# Patient Record
Sex: MALE | Race: WHITE | NOT HISPANIC OR LATINO | Employment: FULL TIME | URBAN - METROPOLITAN AREA
[De-identification: names, ages, dates, MRNs, and addresses within clinical notes are randomized per-mention and may not be internally consistent; named-entity substitution may affect disease eponyms.]

---
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
## 2021-01-03 ENCOUNTER — CLINICAL SUPPORT (OUTPATIENT)
Dept: URGENT CARE | Facility: CLINIC | Age: 21
End: 2021-01-03
Payer: COMMERCIAL

## 2021-01-03 VITALS — TEMPERATURE: 99 F | HEART RATE: 86 BPM | OXYGEN SATURATION: 96 %

## 2021-01-03 DIAGNOSIS — U07.1 COVID-19 VIRUS DETECTED: ICD-10-CM

## 2021-01-03 DIAGNOSIS — J02.9 SORE THROAT: Primary | ICD-10-CM

## 2021-01-03 LAB
CTP QC/QA: YES
SARS-COV-2 RDRP RESP QL NAA+PROBE: POSITIVE

## 2021-01-03 PROCEDURE — U0002: ICD-10-PCS | Mod: QW,S$GLB,, | Performed by: FAMILY MEDICINE

## 2021-01-03 PROCEDURE — U0002 COVID-19 LAB TEST NON-CDC: HCPCS | Mod: QW,S$GLB,, | Performed by: FAMILY MEDICINE

## 2021-01-25 ENCOUNTER — CLINICAL SUPPORT (OUTPATIENT)
Dept: URGENT CARE | Facility: CLINIC | Age: 21
End: 2021-01-25
Payer: COMMERCIAL

## 2021-01-25 DIAGNOSIS — Z11.52 ENCOUNTER FOR SCREENING LABORATORY TESTING FOR COVID-19 VIRUS: Primary | ICD-10-CM

## 2021-01-25 LAB
CTP QC/QA: YES
SARS-COV-2 RDRP RESP QL NAA+PROBE: POSITIVE

## 2021-01-25 PROCEDURE — U0002 COVID-19 LAB TEST NON-CDC: HCPCS | Mod: QW,S$GLB,, | Performed by: FAMILY MEDICINE

## 2021-01-25 PROCEDURE — 99211 OFF/OP EST MAY X REQ PHY/QHP: CPT | Mod: S$GLB,CS,, | Performed by: FAMILY MEDICINE

## 2021-01-25 PROCEDURE — 99211 PR OFFICE/OUTPT VISIT, EST, LEVL I: ICD-10-PCS | Mod: S$GLB,CS,, | Performed by: FAMILY MEDICINE

## 2021-01-25 PROCEDURE — U0002: ICD-10-PCS | Mod: QW,S$GLB,, | Performed by: FAMILY MEDICINE

## 2021-04-29 ENCOUNTER — PATIENT MESSAGE (OUTPATIENT)
Dept: RESEARCH | Facility: HOSPITAL | Age: 21
End: 2021-04-29

## 2021-07-06 ENCOUNTER — PATIENT MESSAGE (OUTPATIENT)
Dept: PEDIATRICS | Facility: CLINIC | Age: 21
End: 2021-07-06

## 2021-07-12 ENCOUNTER — PATIENT MESSAGE (OUTPATIENT)
Dept: PEDIATRICS | Facility: CLINIC | Age: 21
End: 2021-07-12

## 2021-12-21 ENCOUNTER — OFFICE VISIT (OUTPATIENT)
Dept: URGENT CARE | Facility: CLINIC | Age: 21
End: 2021-12-21
Payer: COMMERCIAL

## 2021-12-21 VITALS
HEIGHT: 73 IN | TEMPERATURE: 98 F | HEART RATE: 62 BPM | DIASTOLIC BLOOD PRESSURE: 82 MMHG | OXYGEN SATURATION: 100 % | BODY MASS INDEX: 21.2 KG/M2 | RESPIRATION RATE: 16 BRPM | WEIGHT: 160 LBS | SYSTOLIC BLOOD PRESSURE: 130 MMHG

## 2021-12-21 DIAGNOSIS — J06.9 UPPER RESPIRATORY TRACT INFECTION, UNSPECIFIED TYPE: Primary | ICD-10-CM

## 2021-12-21 DIAGNOSIS — H65.01 NON-RECURRENT ACUTE SEROUS OTITIS MEDIA OF RIGHT EAR: ICD-10-CM

## 2021-12-21 LAB
CTP QC/QA: YES
CTP QC/QA: YES
POC MOLECULAR INFLUENZA A AGN: NEGATIVE
POC MOLECULAR INFLUENZA B AGN: NEGATIVE
SARS-COV-2 RDRP RESP QL NAA+PROBE: NEGATIVE

## 2021-12-21 PROCEDURE — 99213 OFFICE O/P EST LOW 20 MIN: CPT | Mod: S$GLB,,, | Performed by: PHYSICIAN ASSISTANT

## 2021-12-21 PROCEDURE — 87502 INFLUENZA DNA AMP PROBE: CPT | Mod: QW,S$GLB,, | Performed by: PHYSICIAN ASSISTANT

## 2021-12-21 PROCEDURE — U0002: ICD-10-PCS | Mod: QW,S$GLB,, | Performed by: PHYSICIAN ASSISTANT

## 2021-12-21 PROCEDURE — 87502 POCT INFLUENZA A/B MOLECULAR: ICD-10-PCS | Mod: QW,S$GLB,, | Performed by: PHYSICIAN ASSISTANT

## 2021-12-21 PROCEDURE — 99213 PR OFFICE/OUTPT VISIT, EST, LEVL III, 20-29 MIN: ICD-10-PCS | Mod: S$GLB,,, | Performed by: PHYSICIAN ASSISTANT

## 2021-12-21 PROCEDURE — U0002 COVID-19 LAB TEST NON-CDC: HCPCS | Mod: QW,S$GLB,, | Performed by: PHYSICIAN ASSISTANT

## 2022-07-09 ENCOUNTER — OFFICE VISIT (OUTPATIENT)
Dept: URGENT CARE | Facility: CLINIC | Age: 22
End: 2022-07-09
Payer: COMMERCIAL

## 2022-07-09 VITALS
TEMPERATURE: 98 F | DIASTOLIC BLOOD PRESSURE: 79 MMHG | RESPIRATION RATE: 18 BRPM | OXYGEN SATURATION: 98 % | HEIGHT: 74 IN | BODY MASS INDEX: 23.1 KG/M2 | SYSTOLIC BLOOD PRESSURE: 127 MMHG | HEART RATE: 49 BPM | WEIGHT: 180 LBS

## 2022-07-09 DIAGNOSIS — R05.9 COUGH: ICD-10-CM

## 2022-07-09 DIAGNOSIS — J06.9 VIRAL URI WITH COUGH: Primary | ICD-10-CM

## 2022-07-09 DIAGNOSIS — F17.200 SMOKER: ICD-10-CM

## 2022-07-09 LAB
CTP QC/QA: YES
SARS-COV-2 RDRP RESP QL NAA+PROBE: NEGATIVE

## 2022-07-09 PROCEDURE — 3008F PR BODY MASS INDEX (BMI) DOCUMENTED: ICD-10-PCS | Mod: CPTII,S$GLB,, | Performed by: FAMILY MEDICINE

## 2022-07-09 PROCEDURE — 3008F BODY MASS INDEX DOCD: CPT | Mod: CPTII,S$GLB,, | Performed by: FAMILY MEDICINE

## 2022-07-09 PROCEDURE — U0002 COVID-19 LAB TEST NON-CDC: HCPCS | Mod: QW,S$GLB,, | Performed by: FAMILY MEDICINE

## 2022-07-09 PROCEDURE — 3078F PR MOST RECENT DIASTOLIC BLOOD PRESSURE < 80 MM HG: ICD-10-PCS | Mod: CPTII,S$GLB,, | Performed by: FAMILY MEDICINE

## 2022-07-09 PROCEDURE — 3074F SYST BP LT 130 MM HG: CPT | Mod: CPTII,S$GLB,, | Performed by: FAMILY MEDICINE

## 2022-07-09 PROCEDURE — 1159F PR MEDICATION LIST DOCUMENTED IN MEDICAL RECORD: ICD-10-PCS | Mod: CPTII,S$GLB,, | Performed by: FAMILY MEDICINE

## 2022-07-09 PROCEDURE — 1159F MED LIST DOCD IN RCRD: CPT | Mod: CPTII,S$GLB,, | Performed by: FAMILY MEDICINE

## 2022-07-09 PROCEDURE — 1160F RVW MEDS BY RX/DR IN RCRD: CPT | Mod: CPTII,S$GLB,, | Performed by: FAMILY MEDICINE

## 2022-07-09 PROCEDURE — 99213 PR OFFICE/OUTPT VISIT, EST, LEVL III, 20-29 MIN: ICD-10-PCS | Mod: S$GLB,,, | Performed by: FAMILY MEDICINE

## 2022-07-09 PROCEDURE — 1160F PR REVIEW ALL MEDS BY PRESCRIBER/CLIN PHARMACIST DOCUMENTED: ICD-10-PCS | Mod: CPTII,S$GLB,, | Performed by: FAMILY MEDICINE

## 2022-07-09 PROCEDURE — U0002: ICD-10-PCS | Mod: QW,S$GLB,, | Performed by: FAMILY MEDICINE

## 2022-07-09 PROCEDURE — 99213 OFFICE O/P EST LOW 20 MIN: CPT | Mod: S$GLB,,, | Performed by: FAMILY MEDICINE

## 2022-07-09 PROCEDURE — 3074F PR MOST RECENT SYSTOLIC BLOOD PRESSURE < 130 MM HG: ICD-10-PCS | Mod: CPTII,S$GLB,, | Performed by: FAMILY MEDICINE

## 2022-07-09 PROCEDURE — 3078F DIAST BP <80 MM HG: CPT | Mod: CPTII,S$GLB,, | Performed by: FAMILY MEDICINE

## 2022-07-09 RX ORDER — FLUOXETINE HYDROCHLORIDE 20 MG/1
20 CAPSULE ORAL DAILY
COMMUNITY
Start: 2022-07-05 | End: 2022-12-08

## 2022-07-09 RX ORDER — BENZONATATE 100 MG/1
100 CAPSULE ORAL 3 TIMES DAILY PRN
Qty: 30 CAPSULE | Refills: 0 | Status: SHIPPED | OUTPATIENT
Start: 2022-07-09 | End: 2022-07-19

## 2022-07-09 RX ORDER — PROMETHAZINE HYDROCHLORIDE AND DEXTROMETHORPHAN HYDROBROMIDE 6.25; 15 MG/5ML; MG/5ML
5 SYRUP ORAL EVERY 6 HOURS PRN
Qty: 118 ML | Refills: 0 | Status: SHIPPED | OUTPATIENT
Start: 2022-07-09 | End: 2023-09-26

## 2022-07-09 NOTE — PATIENT INSTRUCTIONS
General Discharge Instructions   PLEASE READ YOUR DISCHARGE INSTRUCTIONS ENTIRELY AS IT CONTAINS IMPORTANT INFORMATION.  If you were prescribed a narcotic or controlled medication, do not drive or operate heavy equipment or machinery while taking these medications.  If you were prescribed antibiotics, please take them to completion.  You must understand that you've received an Urgent Care treatment only and that you may be released before all your medical problems are known or treated. You, the patient, will arrange for follow up care as instructed.    OVER THE COUNTER RECOMMENDATIONS/SUGGESTIONS.    Make sure to stay well hydrated.    Use Nasal Saline to mechanically move any post nasal drip from your eustachian tube or from the back of your throat.    Use warm salt water gargles to ease your throat pain. Warm salt water gargles as needed for sore throat- 1/2 tsp salt to 1 cup warm water, gargle as desired.    Use an antihistamine such as Claritin, Zyrtec or Allegra to dry you out.    Use pseudoephedrine (behind the counter) to decongest. Pseudoephedrine 30 mg up to 240 mg /day. It can raise your blood pressure and give you palpitations.    Use mucinex (guaifenesin) to break up mucous up to 2400mg/day to loosen any mucous.    The mucinex DM pill has a cough suppressant that can be sedating. It can be used at night to stop the tickle at the back of your throat.    You can use Mucinex D (it has guaifenesin and a high dose of pseudoephedrine) in the mornings to help decongest.    Use Afrin in each nare for no longer than 3 days, as it is addictive. It can also dry out your mucous membranes and cause elevated blood pressure. This is especially useful if you are flying.    Use Flonase 1-2 sprays/nostril per day. It is a local acting steroid nasal spray, if you develop a bloody nose, stop using the medication immediately.    Sometimes Nyquil at night is beneficial to help you get some rest, however it is sedating and it  does have an antihistamine, and tylenol.    Honey is a natural cough suppressant that can be used.    Tylenol up to 4,000 mg a day is safe for short periods and can be used for body aches, pain, and fever. However in high doses and prolonged use it can cause liver irritation.    Ibuprofen is a non-steroidal anti-inflammatory that can be used for body aches, pain, and fever.However it can also cause stomach irritation if over used.     Follow up with your PCP or specialty clinic as instructed in the next 2-3 days if not improved or as needed. You can call [phone] to schedule an appointment with appropriate provider.      If you condition worsens, we recommend that you receive another evaluation at the emergency room immediately or contact your primary medical clinic's after hours call service to discuss your concerns.      Please return here or go to the Emergency Department for any concerns or worsening condition.   You can also call [phone] to schedule an appointment with the appropriate provider.    Please return here or go to the Emergency Department for any concerns or worsening of condition.    Thank you for choosing [facility]!    Our goal in the Urgent Care is to always provide outstanding medical care. You may receive a survey by mail or e-mail in the next week regarding your experience today. We would greatly appreciate you completing and returning the survey. Your feedback provides us with a way to recognize our staff who provide very good care, and it helps us learn how to improve when your experience was below our aspiration of excellence.      We appreciate you trusting us with your medical care. We hope you feel better soon. We will be happy to take care of you for all of your future medical needs.    Sincerely,    JOES Rosen  NEGATIVE COVID TEST  -You have tested negative for COVID-19 today.  If you did not have a close exposure (as defined below) you can return to  "your normal daily activities to include social distancing, wearing a mask and frequent handwashing.  -A "close exposure" is defined as anyone who has had an exposure (masked or unmasked) to a known COVID -19 positive person within 6 ft for longer than 15 minutes. If your exposure meets this definition, you are required by CDC guidelines to quarantine for at least 7-10 days from time of exposure.  -The CDC states that a test can be performed for an asymptomatic patient (someone who does not have any symptoms) after a close exposure, and that a test should be done if you develop symptoms after a close exposure as described above.  -Specifically, you can test at day 5 or later if asymptomatic in order to get released from quarantine on day 7 or later.  If you develop symptoms sooner, you should test when your symptoms start.  -If you developed symptoms since the exposure, and your test was negative today and less than 5 days from your exposure, you still have to quarantine for 7-10 days from the date of the exposure.  -The 7-10 day quarantine begins from the day you were exposed, not the day of your test.  For example, if your exposure was on a Monday, and you waited until Friday of the same week to get tested and it was negative, your 7-10 day quarantine begins from that Monday, not the Friday you tested negative.  -Please note, if you decide to test as an asymptomatic during your quarantine and you are positive, you will be restarting your quarantine and moving from a possible 10 day quarantine (if you do not test), to a 11 day or greater quarantine.  When to Seek Emergency Medical Attention  Look for emergency warning signs* for COVID-19. If someone is showing any of these signs, seek emergency medical care immediately:    Trouble breathing  Persistent pain or pressure in the chest  New confusion  Inability to wake or stay awake  Pale, gray, or blue-colored skin, lips, or nail beds, depending on skin tone  *This " "list is not all possible symptoms. Please call your medical provider for any other symptoms that are severe or concerning to you.    Call 911 or call ahead to your local emergency facility: Notify the  that you are seeking care for someone who has or may have COVID-19.  Patient Instructions   PLEASE READ YOUR DISCHARGE INSTRUCTIONS ENTIRELY AS IT CONTAINS IMPORTANT INFORMATION.        Please drink plenty of fluids. You body needs increased water but other beverages may aid in comfort.  You will know that you have had enough water to be hydrated when your urine is clear or at least a very pale yellow. Nasal saline may help with removal of mucus as well.  Ibuprofen is preferred for aches and pains as well as fever reduction. If you do not have high blood pressure, then you may use a decongestant such as pseudoephedrine or one of the above medications that have the letter, "-D" following it.  Hot tea with honey can help with sore throats as the heat with reduce the inflammation and the honey will coat your throat to help it feel better. Prescription pain medicine should be used for the significant throat sxs you are experiencing. Do not drive after taking this medication.     Lastly, good hand washing and cough hygiene (cough into your elbow) will help prevent the spread of the illness.  A general rule is that you are no longer contagious once you have been without a fever for over 24 hours without requiring fever reducing medications.   Please get plenty of rest.     Please return here or go to the Emergency Department for any concerns or worsening of condition.     Please take an over the counter antihistamine medication (allegra/Claritin/Zyrtec) of your choice as directed, they may help with some of the runny nose symptoms if you are having them.       Can continue mucinex. Use mucinex (guaifenisin) to break up mucous up to 2400mg/day to loosen any mucous. The mucinex DM pill has a cough suppressant that can " be used at night to stop the tickle at the back of your throat. You may use Mucinex to help thin thick secretions to allow you to expel them but it only works if you drink more water.     If not allergic, please take over the counter Tylenol (Acetaminophen) and/or Motrin (Ibuprofen) as directed for control of pain and/or fever.  Please follow up with your primary care doctor or specialist as needed.     Sore throat recommendations: Warm fluids, warm salt water gargles, throat lozenges, tea, honey, soup, rest, hydration.     Use over the counter flonase: one spray each nostril twice daily OR two sprays each nostril once daily.      Sinus rinses DO NOT USE TAP WATER, if you must, water must be a rolling boil for 1 minute, let it cool, then use.  May use distilled water, or over the counter nasal saline rinses.  Vics vapor rub in shower to help open nasal passages.  May use nasal gel to keep passages moisturized.  May use Nasal saline sprays during the day for added relief of congestion.   For those who go to the gym, please do not use the sauna or steam room now to clear sinuses.     If you  smoke, please stop smoking.        Please return or see your primary care doctor if you develop new or worsening symptoms.      Please arrange follow up with your primary medical clinic as soon as possible. You must understand that you've received an Urgent Care treatment only and that you may be released before all of your medical problems are known or treated. You, the patient, will arrange for follow up as instructed. If your symptoms worsen or fail to improve you should go to the Emergency Room.

## 2022-07-09 NOTE — PROGRESS NOTES
"Subjective:       Patient ID: Zaki Lopez is a 22 y.o. male.    Vitals:  height is 6' 2" (1.88 m) and weight is 81.6 kg (180 lb). His temperature is 97.8 °F (36.6 °C). His blood pressure is 127/79 and his pulse is 49 (abnormal). His respiration is 18 and oxygen saturation is 98%.     Chief Complaint: No chief complaint on file.    Pt c/o sore throat, coughing x1 day.  Provider note begins below:  Pt c/o sore throat and cough x 1 day, he denies any known sick contacts. He tried ibu with relief. He has been able to eat and drink without problem. No fever or chills. No cp or SOB. No GI related symptoms, including, N/v/D or constipation. No anosmia or ageusia.      Cough  This is a new problem. The current episode started yesterday. The problem has been unchanged. The problem occurs constantly. The cough is non-productive. Associated symptoms include headaches, nasal congestion and a sore throat. Pertinent negatives include no chest pain, chills, ear congestion, ear pain, fever, heartburn, hemoptysis, myalgias, postnasal drip, rash, rhinorrhea, shortness of breath, sweats, weight loss or wheezing. Nothing aggravates the symptoms. He has tried nothing for the symptoms. The treatment provided no relief. There is no history of asthma, bronchiectasis, bronchitis, COPD, emphysema, environmental allergies or pneumonia.       Constitution: Negative for activity change, appetite change, chills, sweating, fatigue, fever, unexpected weight change and generalized weakness.   HENT: Positive for sore throat. Negative for ear pain, facial trauma, congestion, nosebleeds, postnasal drip, sinus pain, sinus pressure, trouble swallowing and voice change.    Neck: Negative for neck pain and neck stiffness.   Cardiovascular: Negative for chest pain.   Respiratory: Positive for cough. Negative for bloody sputum, shortness of breath, wheezing and asthma.    Gastrointestinal: Negative for heartburn.   Musculoskeletal: Negative for " "muscle ache.   Skin: Negative for rash.   Allergic/Immunologic: Negative for environmental allergies and asthma.   Neurological: Positive for headaches.       Objective:      Physical Exam   Constitutional: He is oriented to person, place, and time. He appears well-developed.  Non-toxic appearance. He does not appear ill. No distress.   HENT:   Head: Normocephalic and atraumatic.   Ears:   Right Ear: External ear normal.   Left Ear: External ear normal.   Nose: Nose normal.   Mouth/Throat: Uvula is midline, oropharynx is clear and moist and mucous membranes are normal. No trismus in the jaw. No uvula swelling. No oropharyngeal exudate, posterior oropharyngeal edema, posterior oropharyngeal erythema, tonsillar abscesses or cobblestoning. No tonsillar exudate.   Eyes: Conjunctivae, EOM and lids are normal. Pupils are equal, round, and reactive to light.   Neck: Trachea normal and phonation normal. Neck supple. No Brudzinski's sign and no Kernig's sign noted.   Cardiovascular: Bradycardia present.      Comments: Known hx of bradycardia.    Pulmonary/Chest: Effort normal and breath sounds normal.   Musculoskeletal: Normal range of motion.         General: Normal range of motion.   Lymphadenopathy:     He has no cervical adenopathy.   Neurological: He is alert and oriented to person, place, and time.   Skin: Skin is warm, dry, intact and not diaphoretic.   Psychiatric: His speech is normal and behavior is normal. Judgment and thought content normal.   Nursing note and vitals reviewed.        Assessment:       1. Viral URI with cough    2. Cough    3. Smoker        Results for orders placed or performed in visit on 07/09/22   POCT COVID-19 Rapid Screening   Result Value Ref Range    POC Rapid COVID Negative Negative     Acceptable Yes       Offered strep test, he declined, "this doesn't feel like strep."   Plan:       Advised on salt water gargles, throat lozenges, tea with honey, alternate tylenol and ibu " for ha/body aches   cv neg, rtw note provided    Discussed results/diagnosis/plan with patient in clinic. Strict precautions given to patient to monitor for worsening signs and symptoms. Advised to follow up with PCP or specialist.    Explained side effects of medications prescribed with patient and informed him/her to discontinue use if he/she has any side effects and to inform UC or PCP if this occurs. All questions answered. Strict ED verses clinic return precautions stressed and given in depth. Advised if symptoms worsens of fail to improve he/she should go to the Emergency Room. Discharge and follow-up instructions given verbally/printed with the patient who expressed understanding and willingness to comply with my recommendations. Patient voiced understanding and in agreement with current treatment plan. Patient exits the exam room in no acute distress. Conversant and engaged during discharge discussion, verbalized understanding.      Viral URI with cough  -     promethazine-dextromethorphan (PROMETHAZINE-DM) 6.25-15 mg/5 mL Syrp; Take 5 mLs by mouth every 6 (six) hours as needed (cough).  Dispense: 118 mL; Refill: 0  -     benzonatate (TESSALON) 100 MG capsule; Take 1 capsule (100 mg total) by mouth 3 (three) times daily as needed for Cough.  Dispense: 30 capsule; Refill: 0    Cough  -     POCT COVID-19 Rapid Screening    Smoker  -     Ambulatory referral/consult to Smoking Cessation Program           Medical Decision Making:   Clinical Tests:   Lab Tests: Ordered and Reviewed       Patient Instructions     General Discharge Instructions   PLEASE READ YOUR DISCHARGE INSTRUCTIONS ENTIRELY AS IT CONTAINS IMPORTANT INFORMATION.  If you were prescribed a narcotic or controlled medication, do not drive or operate heavy equipment or machinery while taking these medications.  If you were prescribed antibiotics, please take them to completion.  You must understand that you've received an Urgent Care treatment only  and that you may be released before all your medical problems are known or treated. You, the patient, will arrange for follow up care as instructed.    OVER THE COUNTER RECOMMENDATIONS/SUGGESTIONS.    Make sure to stay well hydrated.    Use Nasal Saline to mechanically move any post nasal drip from your eustachian tube or from the back of your throat.    Use warm salt water gargles to ease your throat pain. Warm salt water gargles as needed for sore throat- 1/2 tsp salt to 1 cup warm water, gargle as desired.    Use an antihistamine such as Claritin, Zyrtec or Allegra to dry you out.    Use pseudoephedrine (behind the counter) to decongest. Pseudoephedrine 30 mg up to 240 mg /day. It can raise your blood pressure and give you palpitations.    Use mucinex (guaifenesin) to break up mucous up to 2400mg/day to loosen any mucous.    The mucinex DM pill has a cough suppressant that can be sedating. It can be used at night to stop the tickle at the back of your throat.    You can use Mucinex D (it has guaifenesin and a high dose of pseudoephedrine) in the mornings to help decongest.    Use Afrin in each nare for no longer than 3 days, as it is addictive. It can also dry out your mucous membranes and cause elevated blood pressure. This is especially useful if you are flying.    Use Flonase 1-2 sprays/nostril per day. It is a local acting steroid nasal spray, if you develop a bloody nose, stop using the medication immediately.    Sometimes Nyquil at night is beneficial to help you get some rest, however it is sedating and it does have an antihistamine, and tylenol.    Honey is a natural cough suppressant that can be used.    Tylenol up to 4,000 mg a day is safe for short periods and can be used for body aches, pain, and fever. However in high doses and prolonged use it can cause liver irritation.    Ibuprofen is a non-steroidal anti-inflammatory that can be used for body aches, pain, and fever.However it can also cause  "stomach irritation if over used.     Follow up with your PCP or specialty clinic as instructed in the next 2-3 days if not improved or as needed. You can call [phone] to schedule an appointment with appropriate provider.      If you condition worsens, we recommend that you receive another evaluation at the emergency room immediately or contact your primary medical clinic's after hours call service to discuss your concerns.      Please return here or go to the Emergency Department for any concerns or worsening condition.   You can also call [phone] to schedule an appointment with the appropriate provider.    Please return here or go to the Emergency Department for any concerns or worsening of condition.    Thank you for choosing [facility]!    Our goal in the Urgent Care is to always provide outstanding medical care. You may receive a survey by mail or e-mail in the next week regarding your experience today. We would greatly appreciate you completing and returning the survey. Your feedback provides us with a way to recognize our staff who provide very good care, and it helps us learn how to improve when your experience was below our aspiration of excellence.      We appreciate you trusting us with your medical care. We hope you feel better soon. We will be happy to take care of you for all of your future medical needs.    Sincerely,    JOSE Rosen  NEGATIVE COVID TEST  -You have tested negative for COVID-19 today.  If you did not have a close exposure (as defined below) you can return to your normal daily activities to include social distancing, wearing a mask and frequent handwashing.  -A "close exposure" is defined as anyone who has had an exposure (masked or unmasked) to a known COVID -19 positive person within 6 ft for longer than 15 minutes. If your exposure meets this definition, you are required by CDC guidelines to quarantine for at least 7-10 days from time of exposure.  -The " CDC states that a test can be performed for an asymptomatic patient (someone who does not have any symptoms) after a close exposure, and that a test should be done if you develop symptoms after a close exposure as described above.  -Specifically, you can test at day 5 or later if asymptomatic in order to get released from quarantine on day 7 or later.  If you develop symptoms sooner, you should test when your symptoms start.  -If you developed symptoms since the exposure, and your test was negative today and less than 5 days from your exposure, you still have to quarantine for 7-10 days from the date of the exposure.  -The 7-10 day quarantine begins from the day you were exposed, not the day of your test.  For example, if your exposure was on a Monday, and you waited until Friday of the same week to get tested and it was negative, your 7-10 day quarantine begins from that Monday, not the Friday you tested negative.  -Please note, if you decide to test as an asymptomatic during your quarantine and you are positive, you will be restarting your quarantine and moving from a possible 10 day quarantine (if you do not test), to a 11 day or greater quarantine.  When to Seek Emergency Medical Attention  Look for emergency warning signs* for COVID-19. If someone is showing any of these signs, seek emergency medical care immediately:    Trouble breathing  Persistent pain or pressure in the chest  New confusion  Inability to wake or stay awake  Pale, gray, or blue-colored skin, lips, or nail beds, depending on skin tone  *This list is not all possible symptoms. Please call your medical provider for any other symptoms that are severe or concerning to you.    Call 911 or call ahead to your local emergency facility: Notify the  that you are seeking care for someone who has or may have COVID-19.  Patient Instructions   PLEASE READ YOUR DISCHARGE INSTRUCTIONS ENTIRELY AS IT CONTAINS IMPORTANT INFORMATION.        Please drink  "plenty of fluids. You body needs increased water but other beverages may aid in comfort.  You will know that you have had enough water to be hydrated when your urine is clear or at least a very pale yellow. Nasal saline may help with removal of mucus as well.  Ibuprofen is preferred for aches and pains as well as fever reduction. If you do not have high blood pressure, then you may use a decongestant such as pseudoephedrine or one of the above medications that have the letter, "-D" following it.  Hot tea with honey can help with sore throats as the heat with reduce the inflammation and the honey will coat your throat to help it feel better. Prescription pain medicine should be used for the significant throat sxs you are experiencing. Do not drive after taking this medication.     Lastly, good hand washing and cough hygiene (cough into your elbow) will help prevent the spread of the illness.  A general rule is that you are no longer contagious once you have been without a fever for over 24 hours without requiring fever reducing medications.   Please get plenty of rest.     Please return here or go to the Emergency Department for any concerns or worsening of condition.     Please take an over the counter antihistamine medication (allegra/Claritin/Zyrtec) of your choice as directed, they may help with some of the runny nose symptoms if you are having them.       Can continue mucinex. Use mucinex (guaifenisin) to break up mucous up to 2400mg/day to loosen any mucous. The mucinex DM pill has a cough suppressant that can be used at night to stop the tickle at the back of your throat. You may use Mucinex to help thin thick secretions to allow you to expel them but it only works if you drink more water.     If not allergic, please take over the counter Tylenol (Acetaminophen) and/or Motrin (Ibuprofen) as directed for control of pain and/or fever.  Please follow up with your primary care doctor or specialist as needed.   "   Sore throat recommendations: Warm fluids, warm salt water gargles, throat lozenges, tea, honey, soup, rest, hydration.     Use over the counter flonase: one spray each nostril twice daily OR two sprays each nostril once daily.      Sinus rinses DO NOT USE TAP WATER, if you must, water must be a rolling boil for 1 minute, let it cool, then use.  May use distilled water, or over the counter nasal saline rinses.  Vics vapor rub in shower to help open nasal passages.  May use nasal gel to keep passages moisturized.  May use Nasal saline sprays during the day for added relief of congestion.   For those who go to the gym, please do not use the sauna or steam room now to clear sinuses.     If you  smoke, please stop smoking.        Please return or see your primary care doctor if you develop new or worsening symptoms.      Please arrange follow up with your primary medical clinic as soon as possible. You must understand that you've received an Urgent Care treatment only and that you may be released before all of your medical problems are known or treated. You, the patient, will arrange for follow up as instructed. If your symptoms worsen or fail to improve you should go to the Emergency Room.

## 2022-07-09 NOTE — LETTER
July 9, 2022      [facility]  [address]  [place] [place] [address]  Phone: [phone]       Patient: Zaki Lopez   YOB: 2000  Date of Visit: 07/09/2022    To Whom It May Concern:    Alyse Lopez  was at [facility] on 07/09/2022. The patient may return to work/school on 7/11/2022 with no restrictions. If you have any questions or concerns, or if I can be of further assistance, please do not hesitate to contact me.    Sincerely,    Ngozi Rogers NP

## 2022-07-12 ENCOUNTER — TELEPHONE (OUTPATIENT)
Dept: URGENT CARE | Facility: CLINIC | Age: 22
End: 2022-07-12
Payer: COMMERCIAL

## 2022-12-08 ENCOUNTER — OFFICE VISIT (OUTPATIENT)
Dept: PEDIATRICS | Facility: CLINIC | Age: 22
End: 2022-12-08
Payer: COMMERCIAL

## 2022-12-08 DIAGNOSIS — F41.9 ANXIETY: Primary | ICD-10-CM

## 2022-12-08 DIAGNOSIS — F32.A DEPRESSION, UNSPECIFIED DEPRESSION TYPE: ICD-10-CM

## 2022-12-08 PROCEDURE — 1159F PR MEDICATION LIST DOCUMENTED IN MEDICAL RECORD: ICD-10-PCS | Mod: CPTII,95,, | Performed by: PEDIATRICS

## 2022-12-08 PROCEDURE — 99213 OFFICE O/P EST LOW 20 MIN: CPT | Mod: 95,,, | Performed by: PEDIATRICS

## 2022-12-08 PROCEDURE — 99213 PR OFFICE/OUTPT VISIT, EST, LEVL III, 20-29 MIN: ICD-10-PCS | Mod: 95,,, | Performed by: PEDIATRICS

## 2022-12-08 PROCEDURE — 1159F MED LIST DOCD IN RCRD: CPT | Mod: CPTII,95,, | Performed by: PEDIATRICS

## 2022-12-08 PROCEDURE — 1160F PR REVIEW ALL MEDS BY PRESCRIBER/CLIN PHARMACIST DOCUMENTED: ICD-10-PCS | Mod: CPTII,95,, | Performed by: PEDIATRICS

## 2022-12-08 PROCEDURE — 1160F RVW MEDS BY RX/DR IN RCRD: CPT | Mod: CPTII,95,, | Performed by: PEDIATRICS

## 2022-12-08 RX ORDER — FLUOXETINE HYDROCHLORIDE 20 MG/1
20 CAPSULE ORAL DAILY
Qty: 90 CAPSULE | Refills: 0 | Status: SHIPPED | OUTPATIENT
Start: 2022-12-08 | End: 2023-09-26

## 2022-12-08 NOTE — PROGRESS NOTES
The patient location is: ***  The chief complaint leading to consultation is: ***    Visit type: {TELE AUDIOVISUAL:46646}    Face to Face time with patient: ***  *** minutes of total time spent on the encounter, which includes face to face time and non-face to face time preparing to see the patient (eg, review of tests), Obtaining and/or reviewing separately obtained history, Documenting clinical information in the electronic or other health record, Independently interpreting results (not separately reported) and communicating results to the patient/family/caregiver, or Care coordination (not separately reported).         Each patient to whom he or she provides medical services by telemedicine is:  (1) informed of the relationship between the physician and patient and the respective role of any other health care provider with respect to management of the patient; and (2) notified that he or she may decline to receive medical services by telemedicine and may withdraw from such care at any time.    Notes:   Fluxoetine 20 mg  - followed by psychiary  Anxiety and depression

## 2022-12-08 NOTE — PROGRESS NOTES
The patient location is: car  The chief complaint leading to consultation is: dewey    Visit type: audiovisual    Face to Face time with patient: 6  8 minutes of total time spent on the encounter, which includes face to face time and non-face to face time preparing to see the patient (eg, review of tests), Obtaining and/or reviewing separately obtained history, Documenting clinical information in the electronic or other health record, Independently interpreting results (not separately reported) and communicating results to the patient/family/caregiver, or Care coordination (not separately reported).     Each patient to whom he or she provides medical services by telemedicine is:  (1) informed of the relationship between the physician and patient and the respective role of any other health care provider with respect to management of the patient; and (2) notified that he or she may decline to receive medical services by telemedicine and may withdraw from such care at any time.    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 No cough, congestion, or runny nose. Denies ear pain, or sore throat. No vomiting, or diarrhea.    ALL:Reviewed and or Reconciled.  MEDS:Reviewed and or Reconciled.  IMM:UTD  PMH:problem list reviewed    ROS:   CONSTITUTIONAL:alert, interactive   EYES:no eye discharge   ENT:no URI sx   RESP:nl breathing, no wheezing or shortness of breath   GI: no vomiting or diarrhea   SKIN:no rash    PHYS. EXAM:vital signs have been  reviewed(see nurses notes)   GEN:well nourished, well developed.   SKIN:no facial lesions   EYES: nl conjuctiva   EARS:nl pinnae   PSYCH:in no acute distress, appropriate and interactive     IMP: Diagnoses and all orders for this visit:    Anxiety  -     FLUoxetine 20 MG capsule; Take 1 capsule (20 mg total) by mouth once daily.    Depression, unspecified depression type  -     FLUoxetine 20 MG capsule; Take 1 capsule (20 mg total) by mouth once daily.    Transition to adult care - he verbalized understanding  Will follow up with psychiatry in 3 months  He looks happy and successful! It has been a say taking care of him all of these years

## 2023-03-09 DIAGNOSIS — F41.9 ANXIETY: ICD-10-CM

## 2023-03-09 DIAGNOSIS — F32.A DEPRESSION, UNSPECIFIED DEPRESSION TYPE: ICD-10-CM

## 2023-03-13 RX ORDER — FLUOXETINE HYDROCHLORIDE 20 MG/1
CAPSULE ORAL
Qty: 90 CAPSULE | Refills: 0 | OUTPATIENT
Start: 2023-03-13

## 2023-03-13 NOTE — TELEPHONE ENCOUNTER
At our last visit - we discussed transitioning to adult care and psychiatry - I received refill request - denied at this time

## 2023-09-26 ENCOUNTER — OFFICE VISIT (OUTPATIENT)
Dept: FAMILY MEDICINE | Facility: CLINIC | Age: 23
End: 2023-09-26
Payer: COMMERCIAL

## 2023-09-26 VITALS
OXYGEN SATURATION: 97 % | HEART RATE: 55 BPM | HEIGHT: 74 IN | TEMPERATURE: 98 F | SYSTOLIC BLOOD PRESSURE: 130 MMHG | DIASTOLIC BLOOD PRESSURE: 88 MMHG | BODY MASS INDEX: 27.51 KG/M2 | WEIGHT: 214.38 LBS

## 2023-09-26 DIAGNOSIS — Z23 NEED FOR HPV VACCINATION: ICD-10-CM

## 2023-09-26 DIAGNOSIS — J30.9 CHRONIC ALLERGIC RHINITIS: ICD-10-CM

## 2023-09-26 DIAGNOSIS — Z00.00 WELL ADULT EXAM: Primary | ICD-10-CM

## 2023-09-26 DIAGNOSIS — F41.1 GAD (GENERALIZED ANXIETY DISORDER): ICD-10-CM

## 2023-09-26 DIAGNOSIS — K21.9 GASTROESOPHAGEAL REFLUX DISEASE WITHOUT ESOPHAGITIS: ICD-10-CM

## 2023-09-26 DIAGNOSIS — F33.1 MAJOR DEPRESSIVE DISORDER, RECURRENT EPISODE, MODERATE: ICD-10-CM

## 2023-09-26 PROCEDURE — 87389 HIV-1 AG W/HIV-1&-2 AB AG IA: CPT | Performed by: INTERNAL MEDICINE

## 2023-09-26 PROCEDURE — 80061 LIPID PANEL: CPT | Performed by: INTERNAL MEDICINE

## 2023-09-26 PROCEDURE — 90651 HPV VACCINE 9-VALENT 3 DOSE IM: ICD-10-PCS | Mod: S$GLB,,, | Performed by: INTERNAL MEDICINE

## 2023-09-26 PROCEDURE — 99385 PR PREVENTIVE VISIT,NEW,18-39: ICD-10-PCS | Mod: 25,S$GLB,, | Performed by: INTERNAL MEDICINE

## 2023-09-26 PROCEDURE — 1159F MED LIST DOCD IN RCRD: CPT | Mod: CPTII,S$GLB,, | Performed by: INTERNAL MEDICINE

## 2023-09-26 PROCEDURE — 3075F SYST BP GE 130 - 139MM HG: CPT | Mod: CPTII,S$GLB,, | Performed by: INTERNAL MEDICINE

## 2023-09-26 PROCEDURE — 36415 COLL VENOUS BLD VENIPUNCTURE: CPT | Mod: S$GLB,,, | Performed by: INTERNAL MEDICINE

## 2023-09-26 PROCEDURE — 3079F DIAST BP 80-89 MM HG: CPT | Mod: CPTII,S$GLB,, | Performed by: INTERNAL MEDICINE

## 2023-09-26 PROCEDURE — 3079F PR MOST RECENT DIASTOLIC BLOOD PRESSURE 80-89 MM HG: ICD-10-PCS | Mod: CPTII,S$GLB,, | Performed by: INTERNAL MEDICINE

## 2023-09-26 PROCEDURE — 90651 9VHPV VACCINE 2/3 DOSE IM: CPT | Mod: S$GLB,,, | Performed by: INTERNAL MEDICINE

## 2023-09-26 PROCEDURE — 1159F PR MEDICATION LIST DOCUMENTED IN MEDICAL RECORD: ICD-10-PCS | Mod: CPTII,S$GLB,, | Performed by: INTERNAL MEDICINE

## 2023-09-26 PROCEDURE — 1160F RVW MEDS BY RX/DR IN RCRD: CPT | Mod: CPTII,S$GLB,, | Performed by: INTERNAL MEDICINE

## 2023-09-26 PROCEDURE — 36415 PR COLLECTION VENOUS BLOOD,VENIPUNCTURE: ICD-10-PCS | Mod: S$GLB,,, | Performed by: INTERNAL MEDICINE

## 2023-09-26 PROCEDURE — 86803 HEPATITIS C AB TEST: CPT | Performed by: INTERNAL MEDICINE

## 2023-09-26 PROCEDURE — 84443 ASSAY THYROID STIM HORMONE: CPT | Performed by: INTERNAL MEDICINE

## 2023-09-26 PROCEDURE — 86592 SYPHILIS TEST NON-TREP QUAL: CPT | Performed by: INTERNAL MEDICINE

## 2023-09-26 PROCEDURE — 87491 CHLMYD TRACH DNA AMP PROBE: CPT | Performed by: INTERNAL MEDICINE

## 2023-09-26 PROCEDURE — 3008F BODY MASS INDEX DOCD: CPT | Mod: CPTII,S$GLB,, | Performed by: INTERNAL MEDICINE

## 2023-09-26 PROCEDURE — 1160F PR REVIEW ALL MEDS BY PRESCRIBER/CLIN PHARMACIST DOCUMENTED: ICD-10-PCS | Mod: CPTII,S$GLB,, | Performed by: INTERNAL MEDICINE

## 2023-09-26 PROCEDURE — 3008F PR BODY MASS INDEX (BMI) DOCUMENTED: ICD-10-PCS | Mod: CPTII,S$GLB,, | Performed by: INTERNAL MEDICINE

## 2023-09-26 PROCEDURE — 90471 IMMUNIZATION ADMIN: CPT | Mod: S$GLB,,, | Performed by: INTERNAL MEDICINE

## 2023-09-26 PROCEDURE — 83036 HEMOGLOBIN GLYCOSYLATED A1C: CPT | Performed by: INTERNAL MEDICINE

## 2023-09-26 PROCEDURE — 80053 COMPREHEN METABOLIC PANEL: CPT | Performed by: INTERNAL MEDICINE

## 2023-09-26 PROCEDURE — 85025 COMPLETE CBC W/AUTO DIFF WBC: CPT | Performed by: INTERNAL MEDICINE

## 2023-09-26 PROCEDURE — 90471 HPV VACCINE 9-VALENT 3 DOSE IM: ICD-10-PCS | Mod: S$GLB,,, | Performed by: INTERNAL MEDICINE

## 2023-09-26 PROCEDURE — 3075F PR MOST RECENT SYSTOLIC BLOOD PRESS GE 130-139MM HG: ICD-10-PCS | Mod: CPTII,S$GLB,, | Performed by: INTERNAL MEDICINE

## 2023-09-26 PROCEDURE — 99385 PREV VISIT NEW AGE 18-39: CPT | Mod: 25,S$GLB,, | Performed by: INTERNAL MEDICINE

## 2023-09-26 RX ORDER — FLUOXETINE HYDROCHLORIDE 40 MG/1
40 CAPSULE ORAL DAILY
COMMUNITY
End: 2023-09-26 | Stop reason: SDUPTHER

## 2023-09-26 RX ORDER — CARIPRAZINE 1.5 MG/1
1.5 CAPSULE, GELATIN COATED ORAL
COMMUNITY
Start: 2023-08-31 | End: 2023-09-26 | Stop reason: SDUPTHER

## 2023-09-26 RX ORDER — FLUOXETINE HYDROCHLORIDE 40 MG/1
40 CAPSULE ORAL DAILY
Qty: 90 CAPSULE | Refills: 1 | Status: SHIPPED | OUTPATIENT
Start: 2023-09-26 | End: 2023-10-02 | Stop reason: SDUPTHER

## 2023-09-26 RX ORDER — CARIPRAZINE 1.5 MG/1
1.5 CAPSULE, GELATIN COATED ORAL DAILY
Qty: 90 CAPSULE | Refills: 1 | Status: SHIPPED | OUTPATIENT
Start: 2023-09-26 | End: 2023-10-02 | Stop reason: SDUPTHER

## 2023-09-26 NOTE — PROGRESS NOTES
Subjective:       Patient ID: Zaki Lopez is a 23 y.o. male.    Medication List with Changes/Refills   Current Medications    FLUTICASONE PROPIONATE (FLONASE) 50 MCG/ACTUATION NASAL SPRAY    2 sprays (100 mcg total) by Each Nostril route once daily.    OMEPRAZOLE (PRILOSEC OTC) 20 MG TABLET    1 po qday on empty stomach   Changed and/or Refilled Medications    Modified Medication Previous Medication    FLUOXETINE 40 MG CAPSULE FLUoxetine 40 MG capsule       Take 1 capsule (40 mg total) by mouth once daily.    Take 40 mg by mouth once daily.    VRAYLAR 1.5 MG CAP VRAYLAR 1.5 mg Cap       Take 1 capsule (1.5 mg total) by mouth once daily.    Take 1.5 mg by mouth.   Discontinued Medications    BUPROPION (WELLBUTRIN XL) 150 MG TB24 TABLET    Take 1 tablet (150 mg total) by mouth once daily.    FLUOXETINE 20 MG CAPSULE    Take 1 capsule (20 mg total) by mouth once daily.    PROMETHAZINE-DEXTROMETHORPHAN (PROMETHAZINE-DM) 6.25-15 MG/5 ML SYRP    Take 5 mLs by mouth every 6 (six) hours as needed (cough).       Chief Complaint: Medication Management  He is a new patient here today to establish care. He moved back to [place] from [place] 2 months ago.     He has major depression and anxiety disorder diagnosed at age 13. He has been on medication since that time with only a short period off treatment. He has a history of suicidal ideations int he past but no attempts and no recent thoughts.  He was followed by psychiatry in [place] and is taking fluoxetine 40 mg daily and vraylar 1.5 mg daily for the last couple years.  He feels this working well. He has been unable to get appt with psychiatry and he only has 2 weeks of medication left. He does have anxiety with very rare panic attacks. He does not sleep well.     He has chronic allergic rhinitis that is controlled with as needed flonase and antihistamines.  He denies any active symptoms.     He has intermittent reflux symptoms and will take omeprazole 20 mg  "over the counter as needed. His triggers are sugary foods.     He lives with his mother and feels safe.  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 He completed 2 years and wants to study computer science.  He does exercise regularly and eats healthy.     Tdap---9/203  Influenza vaccine---refused   HPV--1 dose  Covid vaccine---2 doses     Review of Systems   Constitutional:  Negative for appetite change, fatigue, fever and unexpected weight change.   HENT:  Negative for congestion, ear pain, hearing loss, sore throat and trouble swallowing.    Eyes:  Negative for pain and visual disturbance.   Respiratory:  Negative for cough, chest tightness, shortness of breath and wheezing.    Cardiovascular:  Positive for palpitations. Negative for chest pain and leg swelling.   Gastrointestinal:  Negative for abdominal pain, blood in stool, constipation, diarrhea, nausea and vomiting.   Endocrine: Negative for polyuria.   Genitourinary:  Negative for dysuria and hematuria.   Musculoskeletal:  Negative for arthralgias, back pain and myalgias.   Skin:  Negative for rash.   Neurological:  Negative for dizziness, weakness, numbness and headaches.   Hematological:  Does not bruise/bleed easily.   Psychiatric/Behavioral:  Positive for dysphoric mood and sleep disturbance. Negative for suicidal ideas. The patient is nervous/anxious.        Objective:      Vitals:    09/26/23 1332   BP: 130/88   BP Location: Right arm   Patient Position: Sitting   BP Method: Medium (Manual)   Pulse: (!) 55   Temp: 97.9 °F (36.6 °C)   SpO2: 97%   Weight: 97.3 kg (214 lb 6.4 oz)   Height: 6' 2" (1.88 m)     Body mass index is 27.53 kg/m².  Physical Exam    General appearance: No acute distress, cooperative  Eyes: PERRL, EOMI, conjunctiva clear  Ears: normal external ear and pinna, tm clear without drainage, canals clear  Nose: Normal mucosa without drainage  Throat: no exudates or erythema, tonsils not enlarged  Mouth: no " sores or lesions, moist mucous membranes  Neck: FROM, soft, supple, no thyromegaly, no bruits  Lymph: no anterior or posterior cervical adenopathy  Heart::  Regular rate and rhythm, no murmur  Lung: Clear to ascultation bilaterally, no wheezing, no rales, no rhonchi, no distress  Abdomen: Soft, nontender, no distention, no hepatosplenomegaly, bowel sounds normal, no guarding, no rebound, no peritoneal signs  Skin: no rashes, no lesions  Extremities: no edema, no cyanosis  Neuro: CN 2-12 intact, 5/5 muscle strength upper and lower extremity bilaterally, 2+ DTRs UE and LE bilaterally, normal gait  Peripheral pulses: 2+ pedal pulses bilaterally, good perfusion and color  Musculoskeletal: FROM, good strenth, no tenderness  Joint: normal appearance, no swelling, no warmth, no deformity in all joints    Assessment:       1. Well adult exam    2. Chronic allergic rhinitis    3. Gastroesophageal reflux disease without esophagitis    4. Major depressive disorder, recurrent episode, moderate    5. FAVIOLA (generalized anxiety disorder)    6. Need for HPV vaccination        Plan:       Well adult exam  He is due for labs and STD screen.  Given HPV #2 today and will f/u in 4 months for HPV #3.  He did not want influenza vaccine today.   -     CBC Auto Differential  -     Comprehensive Metabolic Panel  -     Hemoglobin A1C  -     HIV 1/2 Ag/Ab (4th Gen)  -     Hepatitis C Antibody  -     Lipid Panel  -     RPR  -     C. trachomatis/N. gonorrhoeae by AMP DNA [place]ch[place]arlin; Urine  -     TSH    Chronic allergic rhinitis  Well controlled and continue current regimen.  on    Gastroesophageal reflux disease without esophagitis  Mild and intermittent. Good control with omeprazole as needed.    Major depressive disorder, recurrent episode, moderate  Good control on this regimen. Will refill his medications today and refer to psychiatry to establish care.   -     Ambulatory referral/consult to Psychiatry; Future; Expected date: 10/03/2023  -      VRAYLAR 1.5 mg Cap; Take 1 capsule (1.5 mg total) by mouth once daily.  Dispense: 90 capsule; Refill: 1  -     FLUoxetine 40 MG capsule; Take 1 capsule (40 mg total) by mouth once daily.  Dispense: 90 capsule; Refill: 1    FAVIOLA (generalized anxiety disorder)  Well controlled and continue current regimen.   -     Ambulatory referral/consult to Psychiatry; Future; Expected date: 10/03/2023    Need for HPV vaccination  -     HPV Vaccine (9-Valent) (3 Dose) (IM)    Follow up in about 4 months (around 1/26/2024) for for HPV #3 and 6 months for chronic issues (if he has not established with psychaitry).

## 2023-09-27 ENCOUNTER — TELEPHONE (OUTPATIENT)
Dept: PSYCHIATRY | Facility: CLINIC | Age: 23
End: 2023-09-27
Payer: COMMERCIAL

## 2023-09-27 ENCOUNTER — PATIENT MESSAGE (OUTPATIENT)
Dept: PSYCHIATRY | Facility: CLINIC | Age: 23
End: 2023-09-27
Payer: COMMERCIAL

## 2023-09-27 LAB
ALBUMIN SERPL BCP-MCNC: 4.5 G/DL (ref 3.5–5.2)
ALP SERPL-CCNC: 96 U/L (ref 55–135)
ALT SERPL W/O P-5'-P-CCNC: 26 U/L (ref 10–44)
ANION GAP SERPL CALC-SCNC: 8 MMOL/L (ref 8–16)
AST SERPL-CCNC: 22 U/L (ref 10–40)
BASOPHILS # BLD AUTO: 0.06 K/UL (ref 0–0.2)
BASOPHILS NFR BLD: 0.8 % (ref 0–1.9)
BILIRUB SERPL-MCNC: 0.3 MG/DL (ref 0.1–1)
BUN SERPL-MCNC: 12 MG/DL (ref 6–20)
CALCIUM SERPL-MCNC: 9.4 MG/DL (ref 8.7–10.5)
CHLORIDE SERPL-SCNC: 106 MMOL/L (ref 95–110)
CHOLEST SERPL-MCNC: 180 MG/DL (ref 120–199)
CHOLEST/HDLC SERPL: 4.9 {RATIO} (ref 2–5)
CO2 SERPL-SCNC: 25 MMOL/L (ref 23–29)
CREAT SERPL-MCNC: 0.9 MG/DL (ref 0.5–1.4)
DIFFERENTIAL METHOD: NORMAL
EOSINOPHIL # BLD AUTO: 0.3 K/UL (ref 0–0.5)
EOSINOPHIL NFR BLD: 3.6 % (ref 0–8)
ERYTHROCYTE [DISTWIDTH] IN BLOOD BY AUTOMATED COUNT: 12.3 % (ref 11.5–14.5)
EST. GFR  (NO RACE VARIABLE): >60 ML/MIN/1.73 M^2
ESTIMATED AVG GLUCOSE: 94 MG/DL (ref 68–131)
GLUCOSE SERPL-MCNC: 88 MG/DL (ref 70–110)
HBA1C MFR BLD: 4.9 % (ref 4–5.6)
HCT VFR BLD AUTO: 46.4 % (ref 40–54)
HCV AB SERPL QL IA: NORMAL
HDLC SERPL-MCNC: 37 MG/DL (ref 40–75)
HDLC SERPL: 20.6 % (ref 20–50)
HGB BLD-MCNC: 15.4 G/DL (ref 14–18)
HIV 1+2 AB+HIV1 P24 AG SERPL QL IA: NORMAL
IMM GRANULOCYTES # BLD AUTO: 0.02 K/UL (ref 0–0.04)
IMM GRANULOCYTES NFR BLD AUTO: 0.3 % (ref 0–0.5)
LDLC SERPL CALC-MCNC: 116.2 MG/DL (ref 63–159)
LYMPHOCYTES # BLD AUTO: 2.3 K/UL (ref 1–4.8)
LYMPHOCYTES NFR BLD: 32 % (ref 18–48)
MCH RBC QN AUTO: 28.5 PG (ref 27–31)
MCHC RBC AUTO-ENTMCNC: 33.2 G/DL (ref 32–36)
MCV RBC AUTO: 86 FL (ref 82–98)
MONOCYTES # BLD AUTO: 0.5 K/UL (ref 0.3–1)
MONOCYTES NFR BLD: 6.7 % (ref 4–15)
NEUTROPHILS # BLD AUTO: 4.1 K/UL (ref 1.8–7.7)
NEUTROPHILS NFR BLD: 56.6 % (ref 38–73)
NONHDLC SERPL-MCNC: 143 MG/DL
NRBC BLD-RTO: 0 /100 WBC
PLATELET # BLD AUTO: 353 K/UL (ref 150–450)
PMV BLD AUTO: 10.3 FL (ref 9.2–12.9)
POTASSIUM SERPL-SCNC: 4.4 MMOL/L (ref 3.5–5.1)
PROT SERPL-MCNC: 7.5 G/DL (ref 6–8.4)
RBC # BLD AUTO: 5.41 M/UL (ref 4.6–6.2)
SODIUM SERPL-SCNC: 139 MMOL/L (ref 136–145)
TRIGL SERPL-MCNC: 134 MG/DL (ref 30–150)
TSH SERPL DL<=0.005 MIU/L-ACNC: 2.96 UIU/ML (ref 0.4–4)
WBC # BLD AUTO: 7.18 K/UL (ref 3.9–12.7)

## 2023-09-28 ENCOUNTER — PATIENT MESSAGE (OUTPATIENT)
Dept: FAMILY MEDICINE | Facility: CLINIC | Age: 23
End: 2023-09-28
Payer: COMMERCIAL

## 2023-09-28 DIAGNOSIS — F33.1 MAJOR DEPRESSIVE DISORDER, RECURRENT EPISODE, MODERATE: ICD-10-CM

## 2023-09-28 LAB
C TRACH DNA SPEC QL NAA+PROBE: NOT DETECTED
N GONORRHOEA DNA SPEC QL NAA+PROBE: NOT DETECTED
RPR SER QL: NORMAL

## 2023-10-02 RX ORDER — CARIPRAZINE 1.5 MG/1
1.5 CAPSULE, GELATIN COATED ORAL DAILY
Qty: 90 CAPSULE | Refills: 1 | Status: SHIPPED | OUTPATIENT
Start: 2023-10-02

## 2023-10-02 RX ORDER — FLUOXETINE HYDROCHLORIDE 40 MG/1
40 CAPSULE ORAL DAILY
Qty: 90 CAPSULE | Refills: 1 | Status: SHIPPED | OUTPATIENT
Start: 2023-10-02

## 2023-10-02 NOTE — TELEPHONE ENCOUNTER
No care due was identified.  [facility] Embedded Care Due Messages. Reference number: [id number].   10/02/2023 2:09:29 PM CDT

## 2023-10-23 ENCOUNTER — PATIENT MESSAGE (OUTPATIENT)
Dept: FAMILY MEDICINE | Facility: CLINIC | Age: 23
End: 2023-10-23
Payer: COMMERCIAL

## 2024-01-30 ENCOUNTER — TELEPHONE (OUTPATIENT)
Dept: FAMILY MEDICINE | Facility: CLINIC | Age: 24
End: 2024-01-30

## 2024-01-30 NOTE — TELEPHONE ENCOUNTER
----- Message from Nelsy Gunter MA sent at 1/30/2024  1:58 PM CST -----  Type:  Sooner Appointment Request    Caller is requesting a sooner appointment.  Caller declined first available appointment listed below.  Caller will not accept being placed on the waitlist and is requesting a message be sent to doctor.    Name of Caller:  pt   When is the first available appointment?  na  Symptoms:  r/s nurse visit   Would the patient rather a call back or a response via [facility]? Call back   Best Call Back Number:  [phone]    Additional Information:  please advise

## 2024-03-26 ENCOUNTER — TELEPHONE (OUTPATIENT)
Dept: FAMILY MEDICINE | Facility: CLINIC | Age: 24
End: 2024-03-26

## 2024-03-26 NOTE — LETTER
March 26, 2024    Zaki Lopez  [address] [address] [address]             [place] [address] [address]  [place] [address]  Phone: [phone]  Fax: [phone] Dear Mr. Zaki Lopez:    We are sorry that you missed your appointment with Dr.Beth Mandi Davidson on 3/26/2024. Your health and follow-up medical care are important to us. Please call our office as soon as possible so that we may reschedule your appointment. If you have already rescheduled your appointment, please disregard this letter.    Sincerely,        Eli Gallego MA

## 2024-05-06 ENCOUNTER — PATIENT MESSAGE (OUTPATIENT)
Dept: FAMILY MEDICINE | Facility: CLINIC | Age: 24
End: 2024-05-06
Payer: COMMERCIAL

## 2024-07-24 ENCOUNTER — PATIENT MESSAGE (OUTPATIENT)
Dept: FAMILY MEDICINE | Facility: CLINIC | Age: 24
End: 2024-07-24
Payer: COMMERCIAL

## 2024-07-24 DIAGNOSIS — F33.1 MAJOR DEPRESSIVE DISORDER, RECURRENT EPISODE, MODERATE: ICD-10-CM

## 2024-07-24 NOTE — TELEPHONE ENCOUNTER
Care Due:                  Date            Visit Type   Department     Provider  --------------------------------------------------------------------------------                                EP -                              PRIMARY      [facility]  Last Visit: 09-      CARE ([facility])   MEDICINE       Ijeoma Davidson  Next Visit: None Scheduled  None         None Found                                                            Last  Test          Frequency    Reason                     Performed    Due Date  --------------------------------------------------------------------------------    Office Visit  6 months...  VRAYLAR..................  09- 03-    CBC.........  12 months..  VRAYLAR..................  09- 09-    CMP.........  12 months..  VRAYLAR..................  09- 09-    Lipid Panel.  12 months..  VRAYLAR..................  09- 09-    [facility] [employer] Embedded Care Due Messages. Reference number: [id number].   7/24/2024 11:28:47 AM CDT

## 2024-07-27 RX ORDER — CARIPRAZINE 1.5 MG/1
1.5 CAPSULE, GELATIN COATED ORAL DAILY
Qty: 90 CAPSULE | Refills: 0 | Status: SHIPPED | OUTPATIENT
Start: 2024-07-27

## 2024-07-27 RX ORDER — FLUOXETINE HYDROCHLORIDE 40 MG/1
40 CAPSULE ORAL DAILY
Qty: 90 CAPSULE | Refills: 0 | Status: SHIPPED | OUTPATIENT
Start: 2024-07-27

## 2024-07-30 ENCOUNTER — OFFICE VISIT (OUTPATIENT)
Dept: SLEEP MEDICINE | Facility: CLINIC | Age: 24
End: 2024-07-30
Attending: PSYCHIATRY & NEUROLOGY
Payer: COMMERCIAL

## 2024-07-30 DIAGNOSIS — G47.30 SLEEP APNEA, UNSPECIFIED TYPE: Primary | ICD-10-CM

## 2024-07-30 PROCEDURE — 99204 OFFICE O/P NEW MOD 45 MIN: CPT | Mod: 95,,, | Performed by: PSYCHIATRY & NEUROLOGY

## 2024-07-30 PROCEDURE — 1160F RVW MEDS BY RX/DR IN RCRD: CPT | Mod: CPTII,95,, | Performed by: PSYCHIATRY & NEUROLOGY

## 2024-07-30 PROCEDURE — 1159F MED LIST DOCD IN RCRD: CPT | Mod: CPTII,95,, | Performed by: PSYCHIATRY & NEUROLOGY

## 2024-07-30 NOTE — PROGRESS NOTES
The patient location is: home  The chief complaint leading to consultation is: sleep disorder  Visit type: audiovisual  Each patient to whom he or she provides medical services by telemedicine is:  (1) informed of the relationship between the physician and patient and the respective role of any other health care provider with respect to management of the patient; and (2) notified that he or she may decline to receive medical services by telemedicine and may withdraw from such care at any time.  31 minutes of total time spent on the encounter, which includes face to face time and non-face to face time preparing to see the patient (eg, review of tests), Obtaining and/or reviewing separately obtained history, Documenting clinical information in the electronic or other health record, Independently interpreting results (not separately reported) and communicating results to the patient/family/caregiver, or Care coordination (not separately reported).      Zaki Lopez is a 24 y.o. male is here to be evaluated for a sleep disorder; referred by Self, Aaareferral. Nils been suffering with excessive daytime sleepiness since middle school.  He has always had fatigue issues/;ow enrgy as well.    Blood work has never given an answer;  constantly gell asleep in cless; sleepy behind the wheel.     Denied sleep paralysis and hallucinations (just had once )  No indications of cataplexy.    Reports morning inertia, settings 5 alarms at items - still late for work.  He has ADHD - not treating at the moments. constantly gell asleep in cless; sleepy behind the wheel. Vyvanse 60 mg - stopped as he believes it made him aggressive; and lost weight so he stopped. Adderall was IR - did not cause such problem.       He was told that he  snored; + excessive daytime sleepiness + fatigue  Denied choking, gasping for air/    The patient does not feel rested upon awakening. + morning inertia.  Takes naps.     The patient  denies  morning headaches and denies  dry mouth on awakening.   Zaki Lopez reports occasional  nasal congestion.      The patient  reports difficulty with falling asleep at times - Melatonin seems to help.       Zaki Lopez  denies symptoms concerning for parasomnia except for occasional somniloquy.  The patient  denies auxiliary symptoms of narcolepsy including sleep onset paralysis, hypnagogic hallucinations, sleep attacks and cataplexy.    Zaki Lopez reports occasional  symptoms concerning for RLS; nocturnal leg movements have not been noticed.   The patient does not experience sleep related leg cramps.         Medications pertinent to sleep  disorders taken currently: no  Previous  medications taken  for sleep disorders:  occasional Melatonin: Vyvanse 60 mg - stopped as he believes it made him aggressive; and lost weight so he stopped. Adderall was IR - did not cause such problem.     Sleep studies  no          7/30/2024     2:25 PM   EPWORTH SLEEPINESS SCALE TOTAL SCORE    Total score 18             7/30/2024     2:25 PM   EPWORTH SLEEPINESS SCALE   Sitting and reading 3   Watching TV 2   Sitting, inactive in a public place (e.g. a theatre or a meeting) 3   As a passenger in a car for an hour without a break 2   Lying down to rest in the afternoon when circumstances permit 3   Sitting and talking to someone 1   Sitting quietly after a lunch without alcohol 1   In a car, while stopped for a few minutes in traffic 3   Total score 18            No data to display                    No data to display                    7/30/2024     2:25 PM   EPWORTH SLEEPINESS SCALE   Sitting and reading 3   Watching TV 2   Sitting, inactive in a public place (e.g. a theatre or a meeting) 3   As a passenger in a car for an hour without a break 2   Lying down to rest in the afternoon when circumstances permit 3   Sitting and talking to someone 1   Sitting quietly after a lunch without alcohol 1   In a car, while  stopped for a few minutes in traffic 3   Total score 18         7/30/2024     2:27 PM   Sleep Clinic New Patient   What time do you go to bed on a week day? (Give a range) 10:30   What time do you go to bed on a day off? (Give a range) 10:30   How long does it take you to fall asleep? (Give a range) 10 minutes   On average, how many times per night do you wake up? 1   How long does it take you to fall back into sleep? (Give a range) 5 min   What time do you wake up to start your day on a week day? (Give a range) 8-10   What time do you wake up to start your day on a day off? (Give a range) 10   What time do you get out of bed? (Give a range) 10   On average, how many hours do you sleep? 9   On average, how many naps do you take per day? 1   Rate your sleep quality from 0 to 5 (0-poor, 5-great). 2   Have you experienced:  N/a   How much weight have you lost or gained (in lbs.) in the last year? 0   On average, how many times per night do you go to the bathroom?  0   Have you ever had a sleep study/CPAP machine/surgery for sleep apnea? No   Have you ever had a CPAP machine for sleep apnea? No   Have you ever had surgery for sleep apnea? No           7/30/2024     2:27 PM   Sleep Clinic ROS    Difficulty breathing through the nose?  Yes   Sore throat or dry mouth in the morning? No   Irregular or very fast heart beat?  Sometimes   Shortness of breath?  No   Acid reflux? Yes   Body aches and pains?  No   Morning headaches? No   Dizziness? No   Mood changes?  No   Do you exercise?  No   Do you feel like moving your legs a lot?  Yes       DME:         PAST MEDICAL HISTORY:    Active Ambulatory Problems     Diagnosis Date Noted    Suicidal ideation 08/13/2015    Weight loss 08/13/2015    Bradycardia 08/13/2015    Temporary high blood pressure 08/13/2015    Chronic abdominal pain 08/13/2015    Depression 08/13/2015    Murmur, heart 09/21/2015    Orthostatic dizziness 09/21/2015     Resolved Ambulatory Problems     Diagnosis  Date Noted    No Resolved Ambulatory Problems     Past Medical History:   Diagnosis Date    ADHD     chronic allergic rhinitis     FAVIOLA (generalized anxiety disorder)     Major depressive disorder, recurrent episode, moderate                 PAST SURGICAL HISTORY:    Past Surgical History:   Procedure Laterality Date    TYMPANOSTOMY TUBE PLACEMENT           FAMILY HISTORY:                Family History   Problem Relation Name Age of Onset    Hypertension Mother      Depression Mother      Anxiety disorder Mother      Alcohol abuse Father      No Known Problems Brother      Hypertension Maternal Grandmother      Coronary artery disease Maternal Grandmother      Hypertension Maternal Grandfather      Heart disease Maternal Grandfather      Stroke Maternal Grandfather      Sudden death Other maternal great aunt and     Pacemaker/defibrilator Neg Hx      Congenital heart disease Neg Hx      Cardiomyopathy Neg Hx      Arrhythmia Neg Hx      Heart attacks under age 50 Neg Hx         SOCIAL HISTORY:          Tobacco:   Social History     Tobacco Use   Smoking Status Former    Types: Vaping with nicotine   Smokeless Tobacco Never       alcohol use:    Social History     Substance and Sexual Activity   Alcohol Use Yes    Comment: once a month                   ALLERGIES:    Review of patient's allergies indicates:   Allergen Reactions    Penicillin g      Hives and rash    Penicillins Rash       CURRENT MEDICATIONS:    Current Outpatient Medications   Medication Sig Dispense Refill    FLUoxetine 40 MG capsule Take 1 capsule (40 mg total) by mouth once daily. 90 capsule 0    fluticasone propionate (FLONASE) 50 mcg/actuation nasal spray 2 sprays (100 mcg total) by Each Nostril route once daily. 16 g 11    omeprazole (PRILOSEC OTC) 20 MG tablet 1 po qday on empty stomach 30 tablet 11    VRAYLAR 1.5 mg Cap Take 1 capsule (1.5 mg total) by mouth once daily. 90 capsule 0     No current facility-administered medications for this  visit.                        PHYSICAL EXAM:  There were no vitals taken for this visit.          ASSESSMENT:    1. Sleep Apnea NEC. The patient symptomatically has  snoring, excessive daytime sleepiness, and excessive daytime fatigue  with exam findings of elevated BMI. The patient has medical co-morbidities of depression  which can be worsened by NITIN. This warrants further investigation for possible obstructive sleep apnea.              PLAN:    Diagnostic: Home Sleep Study. The nature of this procedure and its indication was discussed with the patient. We will notify the patient about sleep study resuts via My Chart.      If HST is inconclusive, will order PSG/MSLT  The patient will be able to come off Fluoxetine for 2 weeks in case of MSLT.     During our discussion today, we talked about the etiology of  NITIN as well as the potential ramifications of untreated sleep apnea, which could include daytime sleepiness, hypertension, heart disease and/or stroke.  We discussed potential treatment options, which could include weight loss, body positioning, continuous positive airway pressure (CPAP), or referral for surgical consideration. Meanwhile, he  is urged to avoid supine sleep, weight gain and alcoholic beverages since all of these can worsen NITIN.     The patient was given open opportunity to ask questions and/or express concerns about treatment plan. Two point patient identifier confirmed.       Precautions: The patient was advised to abstain from driving should he feel sleepy or drowsy.    Follow up: MD after the sleep study has been completed.     ESS ([place] Sleepiness Scale) and other sleep medicine related questionnaires were reviewed with the patient.      46-minute visit. >50% spent counseling patient and coordination of care.  The patient was  cautioned against drowsy driving.

## 2024-08-06 ENCOUNTER — TELEPHONE (OUTPATIENT)
Dept: SLEEP MEDICINE | Facility: OTHER | Age: 24
End: 2024-08-06
Payer: COMMERCIAL

## 2024-08-14 ENCOUNTER — TELEPHONE (OUTPATIENT)
Dept: SLEEP MEDICINE | Facility: OTHER | Age: 24
End: 2024-08-14
Payer: COMMERCIAL

## 2024-08-20 ENCOUNTER — OFFICE VISIT (OUTPATIENT)
Dept: FAMILY MEDICINE | Facility: CLINIC | Age: 24
End: 2024-08-20
Payer: COMMERCIAL

## 2024-08-20 DIAGNOSIS — F33.1 MAJOR DEPRESSIVE DISORDER, RECURRENT EPISODE, MODERATE: Primary | ICD-10-CM

## 2024-08-20 DIAGNOSIS — Z00.00 LABORATORY EXAMINATION ORDERED AS PART OF A COMPLETE PHYSICAL EXAMINATION: ICD-10-CM

## 2024-08-20 DIAGNOSIS — F90.0 ATTENTION DEFICIT HYPERACTIVITY DISORDER (ADHD), PREDOMINANTLY INATTENTIVE TYPE: ICD-10-CM

## 2024-08-20 DIAGNOSIS — F41.1 GAD (GENERALIZED ANXIETY DISORDER): ICD-10-CM

## 2024-08-20 DIAGNOSIS — J30.9 CHRONIC ALLERGIC RHINITIS: ICD-10-CM

## 2024-08-20 DIAGNOSIS — K21.9 GASTROESOPHAGEAL REFLUX DISEASE WITHOUT ESOPHAGITIS: ICD-10-CM

## 2024-08-20 DIAGNOSIS — G47.9 SLEEP DIFFICULTIES: ICD-10-CM

## 2024-08-20 PROCEDURE — 1160F RVW MEDS BY RX/DR IN RCRD: CPT | Mod: CPTII,95,, | Performed by: INTERNAL MEDICINE

## 2024-08-20 PROCEDURE — 1159F MED LIST DOCD IN RCRD: CPT | Mod: CPTII,95,, | Performed by: INTERNAL MEDICINE

## 2024-08-20 PROCEDURE — G2211 COMPLEX E/M VISIT ADD ON: HCPCS | Mod: 95,,, | Performed by: INTERNAL MEDICINE

## 2024-08-20 PROCEDURE — 99214 OFFICE O/P EST MOD 30 MIN: CPT | Mod: 95,,, | Performed by: INTERNAL MEDICINE

## 2024-08-20 RX ORDER — DEXMETHYLPHENIDATE HYDROCHLORIDE 5 MG/1
5 TABLET ORAL 2 TIMES DAILY
Qty: 40 TABLET | Refills: 0 | Status: SHIPPED | OUTPATIENT
Start: 2024-08-20

## 2024-08-20 NOTE — PROGRESS NOTES
Subjective:       Patient ID: Zaki Lopez is a 24 y.o. male.    Medication List with Changes/Refills   New Medications    DEXMETHYLPHENIDATE (FOCALIN) 5 MG TABLET    Take 1 tablet (5 mg total) by mouth 2 (two) times daily.   Current Medications    FLUOXETINE 40 MG CAPSULE    Take 1 capsule (40 mg total) by mouth once daily.    FLUTICASONE PROPIONATE (FLONASE) 50 MCG/ACTUATION NASAL SPRAY    2 sprays (100 mcg total) by Each Nostril route once daily.    OMEPRAZOLE (PRILOSEC OTC) 20 MG TABLET    1 po qday on empty stomach    VRAYLAR 1.5 MG CAP    Take 1 capsule (1.5 mg total) by mouth once daily.       Chief Complaint: No chief complaint on file.  The patient location is: home   The chief complaint leading to consultation is: depression and ADHD    Visit type: audiovisual    Face to Face time with patient: 20 minutes   25 minutes of total time spent on the encounter, which includes face to face time and non-face to face time preparing to see the patient (eg, review of tests), Obtaining and/or reviewing separately obtained history, Documenting clinical information in the electronic or other health record, Independently interpreting results (not separately reported) and communicating results to the patient/family/caregiver, or Care coordination (not separately reported).     Each patient to whom he or she provides medical services by telemedicine is:  (1) informed of the relationship between the physician and patient and the respective role of any other health care provider with respect to management of the patient; and (2) notified that he or she may decline to receive medical services by telemedicine and may withdraw from such care at any time.    Notes:   He would like to follow up on depression and anxiety.  He is currently taking fluoxetine 40 mg daily and vraylar 1.5 mg daily.  He is doing well on this regimen. He has some depression symptoms but it is much better. He denies suicidal ideations. No panic  attacks. He continues to struggle with sleep and was just seen by a sleep specialist and is having a home sleep study.      He has ADHD diagnosed in 7th grade and was on treatment (vyvanse and adderall) until 11th grade. At that time he stopped taking because of poor weight gain and agitation when he would come off the medication.  He is in his second year of  [employer] studying computer science and would like to restart treatment. Last year he had to drop 2 classes because he struggles to stay focus and complete assignments.  He would like to try another medication besides vyvanse.  He will be taking classes Monday through Thursday and works on Fridays.  He has 2 long days (Tuesday and Thursday) and 2 short days (Monday and Wednesday).  He does use marijuana gummies every 2-3 days.  He denies any other drug use.      He has GERD and uses OTC omeprazole 20 mg daily which helps with symptoms. He has never had an EGD.     He has chronic allergic rhinitis but denies any active symptoms. He is not using flonase at this time.     He is living at home and feels safe. He commutes to  [employer].  He is sexually active and would like to be tested for STDs.  He does not exercise. He does not eat healthy.     Tdap---9/2023  Influenza vaccine---refused   HPV--2 doses   Covid vaccine---2 doses     Review of Systems   Constitutional:  Negative for activity change, appetite change, fatigue, fever and unexpected weight change.   HENT:  Negative for congestion, ear pain, hearing loss, rhinorrhea, sore throat and trouble swallowing.    Eyes:  Negative for pain, discharge and visual disturbance.   Respiratory:  Negative for cough, chest tightness, shortness of breath and wheezing.    Cardiovascular:  Negative for chest pain, palpitations and leg swelling.   Gastrointestinal:  Negative for abdominal pain, blood in stool, constipation, diarrhea, nausea and vomiting.   Endocrine: Negative for polydipsia and polyuria.    Genitourinary:  Negative for difficulty urinating, dysuria, hematuria and urgency.   Musculoskeletal:  Negative for arthralgias, back pain, joint swelling, myalgias and neck pain.   Skin:  Negative for rash.   Neurological:  Negative for dizziness, weakness, numbness and headaches.   Hematological:  Does not bruise/bleed easily.   Psychiatric/Behavioral:  Positive for dysphoric mood. Negative for confusion, sleep disturbance and suicidal ideas. The patient is nervous/anxious.        Objective:      There were no vitals filed for this visit.  There is no height or weight on file to calculate BMI.  Physical Exam    Alert, no distress  No respiratory distress    Assessment:       1. Major depressive disorder, recurrent episode, moderate    2. FAVIOLA (generalized anxiety disorder)    3. Sleep difficulties    4. Attention deficit hyperactivity disorder (ADHD), predominantly inattentive type    5. Chronic allergic rhinitis    6. Gastroesophageal reflux disease without esophagitis    7. Laboratory examination ordered as part of a complete physical examination        Plan:       Major depressive disorder, recurrent episode, moderate  Well controlled and continue current regimen. He is due for labs.     FAVIOLA (generalized anxiety disorder)  Good control on fluoxetine and vraylar.     Sleep difficulties  He has seen a sleep specialists and is scheduled for a home sleep study    Attention deficit hyperactivity disorder (ADHD), predominantly inattentive type  ADD as a child with difficulty while in college. No evidence of abuse by . Will start focalin 5 mg bid short acting to use once to twice a day when he has school. He has some very short days (2 hrs) and very long days (12 hrs).  I feel short acting will give him the versatility he needs compared to extended release. He will f/u in 3-4 weeks to recheck and adjust dose if needed.   -     dexmethylphenidate (FOCALIN) 5 MG tablet; Take 1 tablet (5 mg total) by mouth 2 (two)  times daily.  Dispense: 40 tablet; Refill: 0    Chronic allergic rhinitis  No active symptoms    Gastroesophageal reflux disease without esophagitis  Well controlled and continue current regimen.     Laboratory examination ordered as part of a complete physical examination  -     CBC Auto Differential; Future; Expected date: 08/20/2024  -     Comprehensive Metabolic Panel; Future; Expected date: 08/20/2024  -     Lipid Panel; Future; Expected date: 08/20/2024  -     TSH; Future; Expected date: 08/20/2024  -     Hemoglobin A1C; Future; Expected date: 08/20/2024  -     HIV 1/2 Ag/Ab (4th Gen); Future; Expected date: 08/20/2024  -     Hepatitis Panel, Acute; Future; Expected date: 08/20/2024  -     Treponema Pallidium Antibodies IgG, IgM; Future; Expected date: 08/20/2024  -     C. trachomatis/N. gonorrhoeae by AMP DNA [facility]; Urine; Future; Expected date: 08/20/2024      Follow up in about 4 weeks (around 9/17/2024) for for a full physical and to recheck ADD and needs HPV #3. .

## 2024-08-21 ENCOUNTER — LAB VISIT (OUTPATIENT)
Dept: LAB | Facility: HOSPITAL | Age: 24
End: 2024-08-21
Attending: INTERNAL MEDICINE
Payer: COMMERCIAL

## 2024-08-21 ENCOUNTER — PATIENT MESSAGE (OUTPATIENT)
Dept: SLEEP MEDICINE | Facility: CLINIC | Age: 24
End: 2024-08-21
Payer: COMMERCIAL

## 2024-08-21 DIAGNOSIS — Z00.00 LABORATORY EXAMINATION ORDERED AS PART OF A COMPLETE PHYSICAL EXAMINATION: ICD-10-CM

## 2024-08-21 PROCEDURE — 87491 CHLMYD TRACH DNA AMP PROBE: CPT | Performed by: INTERNAL MEDICINE

## 2024-08-21 PROCEDURE — 87591 N.GONORRHOEAE DNA AMP PROB: CPT | Performed by: INTERNAL MEDICINE

## 2024-08-22 ENCOUNTER — PATIENT MESSAGE (OUTPATIENT)
Dept: FAMILY MEDICINE | Facility: CLINIC | Age: 24
End: 2024-08-22
Payer: COMMERCIAL

## 2024-08-23 ENCOUNTER — HOSPITAL ENCOUNTER (OUTPATIENT)
Dept: SLEEP MEDICINE | Facility: OTHER | Age: 24
Discharge: HOME | End: 2024-08-23
Attending: PSYCHIATRY & NEUROLOGY
Payer: COMMERCIAL

## 2024-08-23 DIAGNOSIS — G47.30 SLEEP APNEA, UNSPECIFIED TYPE: ICD-10-CM

## 2024-08-23 DIAGNOSIS — G47.33 OSA (OBSTRUCTIVE SLEEP APNEA): Primary | ICD-10-CM

## 2024-08-23 LAB
C TRACH DNA SPEC QL NAA+PROBE: NOT DETECTED
N GONORRHOEA DNA SPEC QL NAA+PROBE: NOT DETECTED

## 2024-08-23 PROCEDURE — 95800 SLP STDY UNATTENDED: CPT

## 2024-08-26 PROBLEM — G47.30 SLEEP APNEA: Status: ACTIVE | Noted: 2024-08-26

## 2024-08-27 ENCOUNTER — PATIENT MESSAGE (OUTPATIENT)
Dept: SLEEP MEDICINE | Facility: CLINIC | Age: 24
End: 2024-08-27
Payer: COMMERCIAL

## 2024-08-27 PROBLEM — G47.33 OSA (OBSTRUCTIVE SLEEP APNEA): Status: ACTIVE | Noted: 2024-08-26

## 2024-08-28 DIAGNOSIS — G47.33 OSA (OBSTRUCTIVE SLEEP APNEA): Primary | ICD-10-CM

## 2024-08-28 NOTE — PROCEDURES
Patient Name Zaki Lopez Study Ordered By Taya Alvarado  Date of Night 1 08/23/2024 10:07:52 PM Date of Birth 2000  Identification Number [id number]  Overall AHI (4%)* Overall RDI % time < 90% Sp02 Mean Sp02 % time snoring > 30dB  6 21 0% 97.6% 41%  PHYSICIAN INTERPRETATION AND COMMENTS: Findings are consistent with moderate, obstructive sleep apnea (NITIN)  (G47.33), by overall AHI (apnea hypopnea index). However, findings on this study suggest that the degree of sleep  disordered breathing is in the severe range, when RDI is measured. This study was technically adequate to allow for  interpretation.  CLINICAL HISTORY: 24 year old male presented with: 17 inch neck, BMI of 27.5, an [place] sleepiness score of 19, no comorbidities  and symptoms of nocturnal snoring. Based on the clinical history, the patient has a high pre-test probability  of having Moderate NITIN.  SLEEP STUDY FINDINGS: Patient underwent a 1 night Home Sleep Test and by behavioral criteria, slept for approximately  6.61 hours, with a sleep latency of 6 minutes and a sleep efficiency of 96%. Mild sleep disordered breathing (AHI=6) is noted  based on a 4% hypopnea desaturation criteria. The patient slept supine 40.7% of the night based on valid recording time of  6.64 hours and is 1.4 times as likely to have apneas/hypopneas when supine. When considering more subtle measures of  sleep disordered breathing, the overall respiratory disturbance index is moderate(RDI=21) based on a 1% hypopnea  desaturation criteria with confirmation by surrogate arousal indicators, predominantly in the supine position (27  events/hour). The apneas/hypopneas are accompanied by minimal oxygen desaturation (percent time below 90% SpO2:  0%, Min SpO2: 87.3%). The average desaturation across all sleep disordered breathing events is 1.9%. Snoring occurs for  41% (30 dB) of the study, 13.2% is very loud. The mean pulse rate is 51.3 BPM, with very frequent pulse  rate variability (73  events with >= 6 BPM increase/decrease per hour).  TREATMENT CONSIDERATIONS: Consider trial of Auto-titrating CPAP 6-20 cm, mask of patient's choice, and heated  humidification. If patient has difficulty with CPAP adherence or ongoing NITIN symptoms or despite CPAP adherence, then  consider an in-lab titration sleep study in order to determine optimal fixed CPAP setting. Alternatively consider oral  appliance fitted by a dentist specializing in these devices, or surgical consultation for uvulopalatopharyngoplasty (UPPP)  for treatment of obstructive sleep apnea.  DISEASE MANAGEMENT CONSIDERATIONS: Definitive treatment for NITIN is recommended. Consider Sleep Clinic referral for  NITIN management.  Signature: Date: 08- 18:41:08 EST  Study Review: The raw data of this BRIGITTE study has been reviewed, with the report confirmed and electronically signed by Taya Alvarado, Read  and interpreted by Taya Alvarado MD, Diplomate, Sleep Medicine, ABPN.. Caution: The diagnosis of the Obstructive Sleep Apnea Syndrome must  be based on all available clinical data, of which this study is only a part. Thus final diagnosis and treatment recommendations should

## 2024-09-12 DIAGNOSIS — F33.1 MAJOR DEPRESSIVE DISORDER, RECURRENT EPISODE, MODERATE: ICD-10-CM

## 2024-09-12 RX ORDER — FLUOXETINE HYDROCHLORIDE 40 MG/1
40 CAPSULE ORAL DAILY
Qty: 30 CAPSULE | Refills: 0 | Status: SHIPPED | OUTPATIENT
Start: 2024-09-12

## 2024-09-12 RX ORDER — CARIPRAZINE 1.5 MG/1
1.5 CAPSULE, GELATIN COATED ORAL DAILY
Qty: 90 CAPSULE | Refills: 0 | Status: SHIPPED | OUTPATIENT
Start: 2024-09-12

## 2024-09-12 NOTE — TELEPHONE ENCOUNTER
No care due was identified.  Health [facility] Embedded Care Due Messages. Reference number: [id number].   9/12/2024 12:44:52 PM CDT

## 2024-09-12 NOTE — TELEPHONE ENCOUNTER
Refill Routing Note   Medication(s) are not appropriate for processing by [facility] for the following reason(s):        Outside of protocol  Drug-disease interaction    [facility] action(s):  Defer  Route      Medication Therapy Plan: Suicidal ideation; VRAYLAR(OP)      Appointments  past 12m or future 3m with PCP    Date Provider   Last Visit   8/20/2024 Ijeoma Davidson DO   Next Visit   9/23/2024 Ijeoma Davidson DO   ED visits in past 90 days: 0        Note composed:2:02 PM 09/12/2024

## 2024-09-23 ENCOUNTER — OFFICE VISIT (OUTPATIENT)
Dept: FAMILY MEDICINE | Facility: CLINIC | Age: 24
End: 2024-09-23
Payer: COMMERCIAL

## 2024-09-23 VITALS
OXYGEN SATURATION: 95 % | HEIGHT: 74 IN | RESPIRATION RATE: 16 BRPM | TEMPERATURE: 98 F | HEART RATE: 69 BPM | DIASTOLIC BLOOD PRESSURE: 80 MMHG | SYSTOLIC BLOOD PRESSURE: 124 MMHG | BODY MASS INDEX: 30.67 KG/M2 | WEIGHT: 239 LBS

## 2024-09-23 DIAGNOSIS — J30.9 CHRONIC ALLERGIC RHINITIS: ICD-10-CM

## 2024-09-23 DIAGNOSIS — F33.1 MAJOR DEPRESSIVE DISORDER, RECURRENT EPISODE, MODERATE: ICD-10-CM

## 2024-09-23 DIAGNOSIS — K21.9 GASTROESOPHAGEAL REFLUX DISEASE WITHOUT ESOPHAGITIS: ICD-10-CM

## 2024-09-23 DIAGNOSIS — Z23 ENCOUNTER FOR VACCINATION: ICD-10-CM

## 2024-09-23 DIAGNOSIS — F90.0 ATTENTION DEFICIT HYPERACTIVITY DISORDER (ADHD), PREDOMINANTLY INATTENTIVE TYPE: ICD-10-CM

## 2024-09-23 DIAGNOSIS — F41.1 GAD (GENERALIZED ANXIETY DISORDER): ICD-10-CM

## 2024-09-23 DIAGNOSIS — Z23 NEED FOR HPV VACCINATION: ICD-10-CM

## 2024-09-23 DIAGNOSIS — G47.33 OSA (OBSTRUCTIVE SLEEP APNEA): ICD-10-CM

## 2024-09-23 DIAGNOSIS — E66.09 CLASS 1 OBESITY DUE TO EXCESS CALORIES WITH SERIOUS COMORBIDITY AND BODY MASS INDEX (BMI) OF 30.0 TO 30.9 IN ADULT: ICD-10-CM

## 2024-09-23 DIAGNOSIS — Z00.00 WELL ADULT EXAM: Primary | ICD-10-CM

## 2024-09-23 PROCEDURE — 1160F RVW MEDS BY RX/DR IN RCRD: CPT | Mod: CPTII,S$GLB,, | Performed by: INTERNAL MEDICINE

## 2024-09-23 PROCEDURE — 3044F HG A1C LEVEL LT 7.0%: CPT | Mod: CPTII,S$GLB,, | Performed by: INTERNAL MEDICINE

## 2024-09-23 PROCEDURE — 90471 IMMUNIZATION ADMIN: CPT | Mod: S$GLB,,, | Performed by: INTERNAL MEDICINE

## 2024-09-23 PROCEDURE — 99395 PREV VISIT EST AGE 18-39: CPT | Mod: 25,S$GLB,, | Performed by: INTERNAL MEDICINE

## 2024-09-23 PROCEDURE — 90651 9VHPV VACCINE 2/3 DOSE IM: CPT | Mod: S$GLB,,, | Performed by: INTERNAL MEDICINE

## 2024-09-23 PROCEDURE — 90656 IIV3 VACC NO PRSV 0.5 ML IM: CPT | Mod: S$GLB,,, | Performed by: INTERNAL MEDICINE

## 2024-09-23 PROCEDURE — 3008F BODY MASS INDEX DOCD: CPT | Mod: CPTII,S$GLB,, | Performed by: INTERNAL MEDICINE

## 2024-09-23 PROCEDURE — 3079F DIAST BP 80-89 MM HG: CPT | Mod: CPTII,S$GLB,, | Performed by: INTERNAL MEDICINE

## 2024-09-23 PROCEDURE — 90472 IMMUNIZATION ADMIN EACH ADD: CPT | Mod: S$GLB,,, | Performed by: INTERNAL MEDICINE

## 2024-09-23 PROCEDURE — 1159F MED LIST DOCD IN RCRD: CPT | Mod: CPTII,S$GLB,, | Performed by: INTERNAL MEDICINE

## 2024-09-23 PROCEDURE — 3074F SYST BP LT 130 MM HG: CPT | Mod: CPTII,S$GLB,, | Performed by: INTERNAL MEDICINE

## 2024-09-23 RX ORDER — DEXMETHYLPHENIDATE HYDROCHLORIDE 10 MG/1
10 TABLET ORAL 2 TIMES DAILY
Qty: 60 TABLET | Refills: 0 | Status: SHIPPED | OUTPATIENT
Start: 2024-09-23

## 2024-09-23 NOTE — PROGRESS NOTES
Subjective:       Patient ID: Zaki Lopez is a 24 y.o. male.    Medication List with Changes/Refills   New Medications    DEXMETHYLPHENIDATE (FOCALIN) 10 MG TABLET    Take 1 tablet (10 mg total) by mouth 2 (two) times daily.   Current Medications    FLUOXETINE 40 MG CAPSULE    Take 1 capsule (40 mg total) by mouth once daily.    FLUTICASONE PROPIONATE (FLONASE) 50 MCG/ACTUATION NASAL SPRAY    2 sprays (100 mcg total) by Each Nostril route once daily.    OMEPRAZOLE (PRILOSEC OTC) 20 MG TABLET    1 po qday on empty stomach    VRAYLAR 1.5 MG CAP    Take 1 capsule (1.5 mg total) by mouth once daily.   Discontinued Medications    DEXMETHYLPHENIDATE (FOCALIN) 5 MG TABLET    Take 1 tablet (5 mg total) by mouth 2 (two) times daily.       Chief Complaint: Annual Exam  He is here today for his annual exam.     He has depression and anxiety and is taking fluoxetine 40 mg daily and vraylar 1.5 mg daily.  He is doing well on this regimen. He has some depression symptoms but it is much better. He denies suicidal ideations. No panic attacks.      He has ADHD diagnosed in 7th grade and was on treatment (vyvanse and adderall) until 11th grade. At that time he stopped taking because of poor weight gain and agitation when he would come off the medication.  He is in his second year of [employer] studying computer science and was started on focalin 5 mg bid.  He does feel this medication is helping but he is still struggling with attention and focus.  It worked better at first but now he does not feel the effects. He is taking once a day on his short days and bid on this longer days at school. He is tolerating well and denies any side effects.      He has GERD and uses OTC omeprazole 20 mg daily which helps with symptoms. He has never had an EGD.      He has chronic allergic rhinitis but denies any active symptoms. He is not using flonase at this time.     He has NITIN diagnosed with a sleep study on 8/2024 and is now using  "cpap.      He is living at home and feels safe. He commutes to  [facility] and is in his second year studying computer science. He is sexually active.  He does not exercise. He does not eat healthy. He is working at [employer] on the weekends.       Tdap---9/2023  Influenza vaccine---refused   HPV--2 dose  Covid vaccine---2 doses      Review of Systems   Constitutional:  Negative for appetite change, fatigue, fever and unexpected weight change.   HENT:  Negative for congestion, ear pain, hearing loss, sore throat and trouble swallowing.    Eyes:  Negative for pain and visual disturbance.   Respiratory:  Negative for cough, chest tightness, shortness of breath and wheezing.    Cardiovascular:  Negative for chest pain, palpitations and leg swelling.   Gastrointestinal:  Negative for abdominal pain, blood in stool, constipation, diarrhea, nausea and vomiting.   Endocrine: Negative for polyuria.   Genitourinary:  Negative for dysuria and hematuria.   Musculoskeletal:  Negative for arthralgias, back pain and myalgias.   Skin:  Negative for rash.   Neurological:  Negative for dizziness, weakness, numbness and headaches.   Hematological:  Does not bruise/bleed easily.   Psychiatric/Behavioral:  Positive for dysphoric mood. Negative for sleep disturbance and suicidal ideas. The patient is nervous/anxious.        Objective:      Vitals:    09/23/24 0711   BP: 124/80   BP Location: Right arm   Patient Position: Sitting   Pulse: 69   Resp: 16   Temp: 97.5 °F (36.4 °C)   TempSrc: Temporal   SpO2: 95%   Weight: 108.4 kg (238 lb 15.7 oz)   Height: 6' 2" (1.88 m)     Body mass index is 30.68 kg/m².  Physical Exam    General appearance: No acute distress, cooperative  Eyes: PERRL, EOMI, conjunctiva clear  Ears: normal external ear and pinna, tm clear without drainage, canals clear  Nose: Normal mucosa without drainage  Throat: no exudates or erythema, tonsils not enlarged  Mouth: no sores or lesions, moist mucous membranes  Neck: " FROM, soft, supple, no thyromegaly, no bruits  Lymph: no anterior or posterior cervical adenopathy  Heart::  Regular rate and rhythm, no murmur  Lung: Clear to ascultation bilaterally, no wheezing, no rales, no rhonchi, no distress  Abdomen: Soft, nontender, no distention, no hepatosplenomegaly, bowel sounds normal, no guarding, no rebound, no peritoneal signs  Skin: no rashes, no lesions  Extremities: no edema, no cyanosis  Neuro: CN 2-12 intact, 5/5 muscle strength upper and lower extremity bilaterally, 2+ DTRs UE and LE bilaterally, normal gait  Peripheral pulses: 2+ pedal pulses bilaterally, good perfusion and color  Musculoskeletal: FROM, good strenth, no tenderness  Joint: normal appearance, no swelling, no warmth, no deformity in all joints    Assessment:       1. Well adult exam    2. Major depressive disorder, recurrent episode, moderate    3. FAVIOLA (generalized anxiety disorder)    4. Attention deficit hyperactivity disorder (ADHD), predominantly inattentive type    5. Chronic allergic rhinitis    6. Gastroesophageal reflux disease without esophagitis    7. NITIN (obstructive sleep apnea)    8. Class 1 obesity due to excess calories with serious comorbidity and body mass index (BMI) of 30.0 to 30.9 in adult    9. Need for HPV vaccination    10. Encounter for vaccination        Plan:       Well adult exam  He is UTD on labs. Given HPV #3 and flu vaccine today.  STD testing was negative.     Major depressive disorder, recurrent episode, moderate  Good control on fluoxetine and vraylar.     FAVIOLA (generalized anxiety disorder)  Well controlled and continue current regimen.     Attention deficit hyperactivity disorder (ADHD), predominantly inattentive type  He is tolerating focalin well but will adjust his dose to 10 mg bid. He is doing well with using qday on his short days and bid on his long days. He will f/u in 3 months to recheck. No evidence of abuse by .   -     dexmethylphenidate (FOCALIN) 10 MG tablet;  Take 1 tablet (10 mg total) by mouth 2 (two) times daily.  Dispense: 60 tablet; Refill: 0    Chronic allergic rhinitis  No active symptoms    Gastroesophageal reflux disease without esophagitis  Well controlled and continue current regimen.     NITIN (obstructive sleep apnea)  Stable and patient is compliant with use. Patient benefits from regular use of CPAP.     Class 1 obesity due to excess calories with serious comorbidity and body mass index (BMI) of 30.0 to 30.9 in adult  Long discussion on the benefits of healthy eating and regular exercise to help lose weight and help control NITIN.     Need for HPV vaccination  -     hpv vaccine,9-dick (GARDASIL 9) vaccine 0.5 mL    Encounter for vaccination  -     influenza (Flulaval, Fluzone, Fluarix) 45 mcg/0.5 mL IM vaccine (> or = 6 mo) 0.5 mL    Follow up in about 3 months (around 12/23/2024) for ADD recheck.

## 2024-12-02 DIAGNOSIS — F33.1 MAJOR DEPRESSIVE DISORDER, RECURRENT EPISODE, MODERATE: ICD-10-CM

## 2024-12-02 DIAGNOSIS — F90.0 ATTENTION DEFICIT HYPERACTIVITY DISORDER (ADHD), PREDOMINANTLY INATTENTIVE TYPE: ICD-10-CM

## 2024-12-02 NOTE — TELEPHONE ENCOUNTER
No care due was identified.  Health [facility] Embedded Care Due Messages. Reference number: [id number].   12/02/2024 12:30:52 PM CST

## 2024-12-03 RX ORDER — FLUOXETINE HYDROCHLORIDE 40 MG/1
40 CAPSULE ORAL DAILY
Qty: 90 CAPSULE | Refills: 3 | Status: SHIPPED | OUTPATIENT
Start: 2024-12-03

## 2024-12-03 NOTE — TELEPHONE ENCOUNTER
Refill Routing Note   Medication(s) are not appropriate for processing by [facility] for the following reason(s):        Drug-disease interaction  Outside of protocol:     [facility] action(s):  Defer  Route    Medication Therapy Plan: Drug-Disease: FLUoxetine and Suicidal ideation    Pharmacist review requested: Yes     Appointments  past 12m or future 3m with PCP    Date Provider   Last Visit   9/23/2024 Ijeoma Davidson, DO   Next Visit   12/23/2024 Ijeoma Davidson, DO   ED visits in past 90 days: 0        Note composed:9:41 AM 12/03/2024

## 2024-12-03 NOTE — TELEPHONE ENCOUNTER
Refill Routing Note   Medication(s) are not appropriate for processing by [facility] for the following reason(s):        Outside of protocol    [facility] action(s):  Route  Approve             Pharmacist review requested: Yes     Appointments  past 12m or future 3m with PCP    Date Provider   Last Visit   9/23/2024 Ijeoma Davidson, DO   Next Visit   12/23/2024 Ijeoma Davidson, DO   ED visits in past 90 days: 0        Note composed:10:15 AM 12/03/2024

## 2024-12-04 RX ORDER — CARIPRAZINE 1.5 MG/1
1.5 CAPSULE, GELATIN COATED ORAL DAILY
Qty: 90 CAPSULE | Refills: 3 | Status: SHIPPED | OUTPATIENT
Start: 2024-12-04

## 2024-12-04 RX ORDER — DEXMETHYLPHENIDATE HYDROCHLORIDE 10 MG/1
10 TABLET ORAL 2 TIMES DAILY
Qty: 60 TABLET | Refills: 0 | Status: SHIPPED | OUTPATIENT
Start: 2024-12-04

## 2024-12-23 ENCOUNTER — OFFICE VISIT (OUTPATIENT)
Dept: FAMILY MEDICINE | Facility: CLINIC | Age: 24
End: 2024-12-23
Payer: COMMERCIAL

## 2024-12-23 VITALS
HEART RATE: 64 BPM | RESPIRATION RATE: 16 BRPM | TEMPERATURE: 98 F | BODY MASS INDEX: 32 KG/M2 | OXYGEN SATURATION: 97 % | DIASTOLIC BLOOD PRESSURE: 86 MMHG | WEIGHT: 249.31 LBS | HEIGHT: 74 IN | SYSTOLIC BLOOD PRESSURE: 142 MMHG

## 2024-12-23 DIAGNOSIS — E66.811 CLASS 1 OBESITY DUE TO EXCESS CALORIES WITH SERIOUS COMORBIDITY AND BODY MASS INDEX (BMI) OF 30.0 TO 30.9 IN ADULT: ICD-10-CM

## 2024-12-23 DIAGNOSIS — F33.1 MAJOR DEPRESSIVE DISORDER, RECURRENT EPISODE, MODERATE: ICD-10-CM

## 2024-12-23 DIAGNOSIS — J30.9 CHRONIC ALLERGIC RHINITIS: ICD-10-CM

## 2024-12-23 DIAGNOSIS — E66.09 CLASS 1 OBESITY DUE TO EXCESS CALORIES WITH SERIOUS COMORBIDITY AND BODY MASS INDEX (BMI) OF 30.0 TO 30.9 IN ADULT: ICD-10-CM

## 2024-12-23 DIAGNOSIS — G47.33 OSA (OBSTRUCTIVE SLEEP APNEA): ICD-10-CM

## 2024-12-23 DIAGNOSIS — F41.1 GAD (GENERALIZED ANXIETY DISORDER): ICD-10-CM

## 2024-12-23 DIAGNOSIS — K21.9 GASTROESOPHAGEAL REFLUX DISEASE WITHOUT ESOPHAGITIS: ICD-10-CM

## 2024-12-23 DIAGNOSIS — F90.0 ATTENTION DEFICIT HYPERACTIVITY DISORDER (ADHD), PREDOMINANTLY INATTENTIVE TYPE: Primary | ICD-10-CM

## 2024-12-23 PROCEDURE — 3044F HG A1C LEVEL LT 7.0%: CPT | Mod: CPTII,S$GLB,, | Performed by: INTERNAL MEDICINE

## 2024-12-23 PROCEDURE — G2211 COMPLEX E/M VISIT ADD ON: HCPCS | Mod: S$GLB,,, | Performed by: INTERNAL MEDICINE

## 2024-12-23 PROCEDURE — 3008F BODY MASS INDEX DOCD: CPT | Mod: CPTII,S$GLB,, | Performed by: INTERNAL MEDICINE

## 2024-12-23 PROCEDURE — 1159F MED LIST DOCD IN RCRD: CPT | Mod: CPTII,S$GLB,, | Performed by: INTERNAL MEDICINE

## 2024-12-23 PROCEDURE — 99214 OFFICE O/P EST MOD 30 MIN: CPT | Mod: S$GLB,,, | Performed by: INTERNAL MEDICINE

## 2024-12-23 PROCEDURE — 3079F DIAST BP 80-89 MM HG: CPT | Mod: CPTII,S$GLB,, | Performed by: INTERNAL MEDICINE

## 2024-12-23 PROCEDURE — 3077F SYST BP >= 140 MM HG: CPT | Mod: CPTII,S$GLB,, | Performed by: INTERNAL MEDICINE

## 2024-12-23 PROCEDURE — 1160F RVW MEDS BY RX/DR IN RCRD: CPT | Mod: CPTII,S$GLB,, | Performed by: INTERNAL MEDICINE

## 2024-12-23 NOTE — PROGRESS NOTES
Subjective:       Patient ID: Zaki Lopez is a 24 y.o. male.    Medication List with Changes/Refills   Current Medications    DEXMETHYLPHENIDATE (FOCALIN) 10 MG TABLET    Take 1 tablet (10 mg total) by mouth 2 (two) times daily.    FLUOXETINE 40 MG CAPSULE    Take 1 capsule (40 mg total) by mouth once daily.    FLUTICASONE PROPIONATE (FLONASE) 50 MCG/ACTUATION NASAL SPRAY    2 sprays (100 mcg total) by Each Nostril route once daily.    OMEPRAZOLE (PRILOSEC OTC) 20 MG TABLET    1 po qday on empty stomach    VRAYLAR 1.5 MG CAP    Take 1 capsule (1.5 mg total) by mouth once daily.       Chief Complaint: Follow-up  She is here today to f/u on chronic medical issues and ADD recehcek.     He has depression and anxiety and is taking fluoxetine 40 mg daily and vraylar 1.5 mg daily.  He is doing well on this regimen. He denies any active symptoms. He denies suicidal ideations. No panic attacks.      He has ADHD diagnosed in 7th grade and was on treatment (vyvanse and adderall) until 11th grade. At that time he stopped taking because of poor weight gain and agitation when he would come off the medication.  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  He feels this dose is doing much better. He was able to get all As and Bs this semester. He is taking once a day on his short days and bid on this longer days at school. He is tolerating well and denies any side effects.      He has GERD and uses OTC omeprazole 20 mg daily which helps with symptoms. He has never had an EGD.      He has chronic allergic rhinitis but denies any active symptoms. He is not using flonase at this time.     He has NITIN diagnosed with a sleep study on 8/2024 and is now using cpap.      He is living at home and feels safe. He commutes to  [place] and is in his second year studying computer science. He is sexually active.  He does not exercise. He does not eat healthy. He is working at  "[employer] on the weekends.       Tdap---9/2023  Influenza vaccine---refused   HPV--2 dose  Covid vaccine---2 doses     Review of Systems   Constitutional:  Negative for appetite change, fatigue, fever and unexpected weight change.   HENT:  Negative for congestion, ear pain, hearing loss, sore throat and trouble swallowing.    Eyes:  Negative for pain and visual disturbance.   Respiratory:  Negative for cough, chest tightness, shortness of breath and wheezing.    Cardiovascular:  Negative for chest pain, palpitations and leg swelling.   Gastrointestinal:  Negative for abdominal pain, blood in stool, constipation, diarrhea, nausea and vomiting.   Endocrine: Negative for polyuria.   Genitourinary:  Negative for dysuria and hematuria.   Musculoskeletal:  Negative for arthralgias, back pain and myalgias.   Skin:  Negative for rash.   Neurological:  Negative for dizziness, weakness, numbness and headaches.   Hematological:  Does not bruise/bleed easily.   Psychiatric/Behavioral:  Negative for dysphoric mood, sleep disturbance and suicidal ideas. The patient is not nervous/anxious.        Objective:      Vitals:    12/23/24 0656   BP: (!) 142/86   BP Location: Right arm   Patient Position: Sitting   Pulse: 64   Resp: 16   Temp: 98.4 °F (36.9 °C)   TempSrc: Temporal   SpO2: 97%   Weight: 113.1 kg (249 lb 5.4 oz)   Height: 6' 2" (1.88 m)     Body mass index is 32.01 kg/m².  Physical Exam    General appearance: alert, no acute distress  Head: atraumatic  Eyes: PERRL, EMOI, normal conjunctiva, no drainage  Ears: tm normal with good visualization of landmarks, no erythema or pus, canals normal, external ear normal  Nose: normal mucosa, no polyps or sores, no rhinorrhea  Throat: no erythema, no exudates, tonsils appear normal  Mouth: no sores or lesion, moist mucous membranes  Neck: supple, FROM, no masses, no tenderness  Lymph: no posterior or cervical adenopathy  Lungs: no distress, no retractions, clear to ascultation " bilaterally, no wheezing, no rales, no rhonchi  Heart:: Regular rate and rhythm, no murmur  Abdomen: soft, non-tender, no guarding, no rebound, no peritoneal signs, bowel sounds normal, no hepatosplenomegaly, no masses  Skin: no rashes or lesion  Perfusion: good capillary refill, normal pulses    Assessment:       1. Attention deficit hyperactivity disorder (ADHD), predominantly inattentive type    2. Major depressive disorder, recurrent episode, moderate    3. FAVIOLA (generalized anxiety disorder)    4. Chronic allergic rhinitis    5. Gastroesophageal reflux disease without esophagitis    6. NITIN (obstructive sleep apnea)    7. Class 1 obesity due to excess calories with serious comorbidity and body mass index (BMI) of 30.0 to 30.9 in adult        Plan:       Attention deficit hyperactivity disorder (ADHD), predominantly inattentive type  Good control on this dose of focalin. He is tolerating well and no evidence of abuse by .  Mildly elevated BP and he will monitor at home.    Major depressive disorder, recurrent episode, moderate  Well controlled and continue current regimen.     FAVIOLA (generalized anxiety disorder)  He is doing well on this regimen    Chronic allergic rhinitis  No active symptoms    Gastroesophageal reflux disease without esophagitis  Controlled on omeprazole    NITIN (obstructive sleep apnea)  Stable and patient is compliant with use. Patient benefits from regular use of CPAP.     Class 1 obesity due to excess calories with serious comorbidity and body mass index (BMI) of 30.0 to 30.9 in adult  Long discussion on the benefits of healthy eating and regular exercise to help lose weight.     Follow up in about 4 months (around 4/23/2025) for ADD recheck.

## 2025-02-04 ENCOUNTER — OFFICE VISIT (OUTPATIENT)
Dept: SLEEP MEDICINE | Facility: CLINIC | Age: 25
End: 2025-02-04
Attending: PSYCHIATRY & NEUROLOGY
Payer: COMMERCIAL

## 2025-02-04 DIAGNOSIS — G47.33 OSA (OBSTRUCTIVE SLEEP APNEA): Primary | ICD-10-CM

## 2025-02-04 PROCEDURE — 98002 SYNCH AUDIO-VIDEO NEW MOD 45: CPT | Mod: 95,,, | Performed by: PSYCHIATRY & NEUROLOGY

## 2025-02-04 NOTE — PROGRESS NOTES
"      The patient location is: home  The chief complaint leading to consultation is: sleep disorder  Visit type: audiovisual  Each patient to whom he or she provides medical services by telemedicine is:  (1) informed of the relationship between the physician and patient and the respective role of any other health care provider with respect to management of the patient; and (2) notified that he or she may decline to receive medical services by telemedicine and may withdraw from such care at any time.  31 minutes of total time spent on the encounter, which includes face to face time and non-face to face time preparing to see the patient (eg, review of tests), Obtaining and/or reviewing separately obtained history, Documenting clinical information in the electronic or other health record, Independently interpreting results (not separately reported) and communicating results to the patient/family/caregiver, or Care coordination (not separately reported).           Zaki Lopez is a 24 y.o. male seen today for CPAP follow up. Last seen on 2024.      The patient reports improved sleep continuity and daytime sleepiness on PAP. ESS today is 8/24.  Denies break through snoring.  No  dry mouth.  No aerophagia or air hunger. Reports occasional mask leaks and discomfort. Using a Resmed  mask. Not leaking .    ESS improved from 18 down to 3/24 since starting CPAP.    No longer falling asleep at work; driving is no longer a concern.    Reports "day and night" difference since his last visit.    Back on Focalin 10 mg for ADHD which also helps residual sleepiness effectively./     Bedtime: 11 PM  SL: 5 min  Awakenings - none; sleeping thorough the the night  Waking up 7:30 AM    Refreshed    Much less naps.      DME: [facility] 11 delmar 2024:     Zaki Lpoez 2025 - 2025 : 2000 Age: 24 years Gender: Male [place] [address] [facility] Compliance Report Compliance " Payor Standard Usage 2025 - 2025 Usage days 24/30 days (80%) >= 4 hours 21 days (70%) < 4 hours 3 days (10%) Usage hours 159 hours 12 minutes Average usage (total days) 5 hours 18 minutes Average usage (days used) 6 hours 38 minutes Median usage (days used) 6 hours 34 minutes Total used hours (value since last reset - 2025) 798 hours AirSense 11 AutoSet Serial number [id number] Mode AutoSet Min Pressure 5 cmH2O Max Pressure 20 cmH2O EPR Fulltime EPR level 3 Response Standard Therapy Pressure - cmH2O Median: 8.1 95th percentile: 10.9 Maximum: 12.2 Leaks - L/min Median: 2.2 95th percentile: 8.6 Maximum: 13.6 Events per hour AI: 0.4 HI: 0.1 AHI: 0.5 Apnea Index Central: 0.1 Obstructive: 0.3 Unknown: 0.0 RERA Index 0.1 Cheyne-Evans respiration (average duration per night) 0 minutes (0%) Usage - hours Printed on 2025 - [employer] version 4.48.0-6.0 Page 1 of 1 Zaki Lopez 2025 - 2025 : 2000 Age: 24 years Gender: Male [facility] [address] [facility] Therapy Report AirSense 11 AutoSet SN: [id number] Usage (hours) Usage days 24/30 (80%) >= 4 hour days 21 (70%) < 4 hour days 3 (10%) Days not used 6 (20%) Days no data 0 (0%) Used/day (avg.) 6.6 hrs. Leak (L/min) Set threshold 24.0 L/min Maximum (avg) 13.6 95th % (avg) 8.6 Median (avg) 2.2 Pressure (cmH2O) Mode AutoSet Set EPR Fulltime, 3.0 Set Max Pressure 20.0 Set Min Pressure 5.0 Maximum (avg) 12.2 95th % (avg) 10.9 Median (avg) 8.1 AHI (events/hour) AHI 0.5 HI 0.1 AI 0.4 TOMASA 0.1 OAI 0.3 UAI 0.0 RERA 0.1 % (avg) 0.0 Printed on 2025 - [employer] version 4.48.0-6.0         INTERVAL HISTORY:    2024:    Zaki Lopez is a 24 y.o. male is here to be evaluated for a sleep disorder; referred by No ref. provider found. Nils been suffering with excessive daytime sleepiness since middle school.  He has always had fatigue issues/;ow enrgy as well.    Blood work has never given an  answer;  constantly gell asleep in cless; sleepy behind the wheel.     Denied sleep paralysis and hallucinations (just had once )  No indications of cataplexy.    Reports morning inertia, settings 5 alarms at items - still late for work.  He has ADHD - not treating at the moments. constantly gell asleep in cless; sleepy behind the wheel. Vyvanse 60 mg - stopped as he believes it made him aggressive; and lost weight so he stopped. Adderall was IR - did not cause such problem.       He was told that he  snored; + excessive daytime sleepiness + fatigue  Denied choking, gasping for air/    The patient does not feel rested upon awakening. + morning inertia.  Takes naps.     The patient  denies morning headaches and denies  dry mouth on awakening.   Zaki John reports occasional  nasal congestion.      The patient  reports difficulty with falling asleep at times - Melatonin seems to help.       Zaki John  denies symptoms concerning for parasomnia except for occasional somniloquy.  The patient  denies auxiliary symptoms of narcolepsy including sleep onset paralysis, hypnagogic hallucinations, sleep attacks and cataplexy.    Christophermendy John reports occasional  symptoms concerning for RLS; nocturnal leg movements have not been noticed.   The patient does not experience sleep related leg cramps.         Medications pertinent to sleep  disorders taken currently: no  Previous  medications taken  for sleep disorders:  occasional Melatonin: Vyvanse 60 mg - stopped as he believes it made him aggressive; and lost weight so he stopped. Adderall was IR - did not cause such problem.     Sleep studies  8/2024: Mild sleep disordered breathing (AHI=6) is noted  based on a 4% hypopnea desaturation criteria. The patient slept supine 40.7% of the night based on valid recording time of  6.64 hours and is 1.4 times as likely to have apneas/hypopneas when supine. When considering more subtle measures of  sleep disordered  breathing, the overall respiratory disturbance index is moderate(RDI=21) based on a 1% hypopnea  desaturation criteria with confirmation by surrogate arousal indicators, predominantly in the supine position (27  events/hour). The apneas/hypopneas are accompanied by minimal oxygen desaturation (percent time below 90% SpO2:  0%, Min SpO2: 87.3%). The average desaturation across all sleep disordered breathing events is 1.9%. Snoring occurs for  41% (30 dB) of the study, 13.2% is very loud. T          2/4/2025     8:42 AM 2/4/2025     8:09 AM 7/30/2024     2:25 PM   EPWORTH SLEEPINESS SCALE TOTAL SCORE    Total score 3  3  18       Proxy-reported         PHYSICAL EXAM:  There were no vitals taken for this visit.          ASSESSMENT:    1. NITIN; moderate by RDI. The patient symptomatically has  snoring, excessive daytime sleepiness, and excessive daytime fatigue  with exam findings of elevated BMI. The patient has medical co-morbidities of depression  which can be worsened by NITIN. This warrants further investigation for possible obstructive sleep apnea.      2. Sleepiness since coty age with extremely high ESS of 18, sleep intertia        PLAN:    Sleep study were discussed with graphs and chart demonstration, all the questions were answered.   Given significant improvement on APAP, will not proceed with MSLT at this point.  Continue  Focalin as per psychiatry.     During our discussion today, we talked about the etiology of  NITIN as well as the potential ramifications of untreated sleep apnea, which could include daytime sleepiness, hypertension, heart disease and/or stroke.  We discussed potential treatment options, which could include weight loss, body positioning, continuous positive airway pressure (CPAP), or referral for surgical consideration. Meanwhile, he  is urged to avoid supine sleep, weight gain and alcoholic beverages since all of these can worsen NITIN.     The patient was given open opportunity to ask  questions and/or express concerns about treatment plan. Two point patient identifier confirmed.       Precautions: The patient was advised to abstain from driving should he feel sleepy or drowsy.    Follow up: MD after the sleep study has been completed.     ESS ([place] Sleepiness Scale) and other sleep medicine related questionnaires were reviewed with the patient.      46-minute visit. >50% spent counseling patient and coordination of care.  The patient was  cautioned against drowsy driving.

## 2025-03-14 ENCOUNTER — OFFICE VISIT (OUTPATIENT)
Dept: FAMILY MEDICINE | Facility: CLINIC | Age: 25
End: 2025-03-14
Payer: COMMERCIAL

## 2025-03-14 VITALS
TEMPERATURE: 98 F | SYSTOLIC BLOOD PRESSURE: 126 MMHG | DIASTOLIC BLOOD PRESSURE: 84 MMHG | OXYGEN SATURATION: 98 % | WEIGHT: 241.5 LBS | HEIGHT: 74 IN | BODY MASS INDEX: 30.99 KG/M2 | RESPIRATION RATE: 18 BRPM | HEART RATE: 93 BPM

## 2025-03-14 DIAGNOSIS — R05.1 ACUTE COUGH: Primary | ICD-10-CM

## 2025-03-14 DIAGNOSIS — G47.33 OBSTRUCTIVE SLEEP APNEA SYNDROME: ICD-10-CM

## 2025-03-14 DIAGNOSIS — J20.9 BRONCHITIS WITH BRONCHOSPASM: ICD-10-CM

## 2025-03-14 RX ORDER — AZITHROMYCIN 250 MG/1
TABLET, FILM COATED ORAL
Qty: 6 TABLET | Refills: 0 | Status: SHIPPED | OUTPATIENT
Start: 2025-03-14 | End: 2025-03-19

## 2025-03-14 NOTE — PROGRESS NOTES
Subjective:       Patient ID: Zaki Lopez is a 25 y.o. male.    Chief Complaint: Cough    Cough  Pertinent negatives include no chest pain, fever, headaches, shortness of breath or wheezing.     Here with concerns for cough. Onset over the past few weeks, not improving. Using cough drops. Denies fever. He has been on Cpap the past several months with good results. He does have humidity on his machine. He was concerned it might be from the Cpap. He is taking OTC pantoprazole for his acid reflux. States cough is not productive, but sounds really wet. He has no other concerns. See ROS    The following portion of the patients history was reviewed and updated as appropriate: allergies, current medications, past medical and surgical history. Past social history and problem list reviewed. Family PMH and Past social history reviewed. Tobacco, Illicit drug use reviewed.      Review of patient's allergies indicates:   Allergen Reactions    Penicillin g      Hives and rash    Penicillins Rash       Current Medications[1]    Past Medical History:   Diagnosis Date    ADHD     chronic allergic rhinitis     FAVIOLA (generalized anxiety disorder)     Major depressive disorder, recurrent episode, moderate     Sleep apnea, unspecified 09/23/2024       Past Surgical History:   Procedure Laterality Date    TYMPANOSTOMY TUBE PLACEMENT         Social History[2]    Review of Systems   Constitutional:  Negative for fatigue and fever.   HENT: Negative.     Eyes:  Negative for visual disturbance.   Respiratory:  Positive for cough (not productive). Negative for chest tightness, shortness of breath and wheezing.         Chest congestion   Cardiovascular:  Negative for chest pain, palpitations and leg swelling.   Gastrointestinal:  Negative for abdominal pain, blood in stool, diarrhea, nausea and vomiting.   Genitourinary: Negative.    Musculoskeletal:  Negative for arthralgias, back pain and gait problem.   Skin: Negative.   "  Neurological:  Negative for headaches.       Objective:      /84   Pulse 93   Temp 98.2 °F (36.8 °C)   Resp 18   Ht 6' 2" (1.88 m)   Wt 109.5 kg (241 lb 8.2 oz)   SpO2 98%   BMI 31.01 kg/m²      Physical Exam  Constitutional:       Appearance: Normal appearance. He is obese.   HENT:      Head: Normocephalic.      Nose: No congestion or rhinorrhea.      Mouth/Throat:      Mouth: Mucous membranes are moist.      Pharynx: No postnasal drip.   Eyes:      Pupils: Pupils are equal, round, and reactive to light.   Neck:      Thyroid: No thyromegaly.   Cardiovascular:      Rate and Rhythm: Normal rate and regular rhythm.      Pulses: Normal pulses.      Heart sounds: Normal heart sounds. No murmur heard.  Pulmonary:      Effort: Pulmonary effort is normal.      Breath sounds: No decreased breath sounds or wheezing.      Comments: Congestion, clears with cough  Musculoskeletal:         General: Normal range of motion.      Cervical back: Normal range of motion.      Comments: Gait normal.    Skin:     General: Skin is warm and dry.      Capillary Refill: Capillary refill takes less than 2 seconds.   Neurological:      General: No focal deficit present.      Mental Status: He is alert.   Psychiatric:         Attention and Perception: Attention and perception normal.         Mood and Affect: Mood and affect normal.         Speech: Speech normal.         Behavior: Behavior normal.         Assessment:       1. Acute cough    2. Bronchitis with bronchospasm    3. Obstructive sleep apnea syndrome        Plan:       Acute cough: he will continue with OTC cough medication    Bronchitis with bronchospasm: Due to duration and presenting exam will cover with antibiotics. Take as directed. Complete full course of medication.    Obstructive sleep apnea syndrome: continue with Cpap.     Other orders  -     azithromycin (Z-NEDA) 250 MG tablet; Take 2 tablets by mouth on day 1; Take 1 tablet by mouth on days 2-5  Dispense: 6 " tablet; Refill: 0       Continue current medication  Take medications only as prescribed  Healthy diet, exercise  Adequate rest  Adequate hydration  Avoid allergens  Avoid excessive caffeine     Follow up if not improving         [1]   Current Outpatient Medications:     dexmethylphenidate (FOCALIN) 10 MG tablet, Take 1 tablet (10 mg total) by mouth 2 (two) times daily., Disp: 60 tablet, Rfl: 0    FLUoxetine 40 MG capsule, Take 1 capsule (40 mg total) by mouth once daily., Disp: 90 capsule, Rfl: 3    fluticasone propionate (FLONASE) 50 mcg/actuation nasal spray, 2 sprays (100 mcg total) by Each Nostril route once daily., Disp: 16 g, Rfl: 11    VRAYLAR 1.5 mg Cap, Take 1 capsule (1.5 mg total) by mouth once daily., Disp: 90 capsule, Rfl: 3  [2]   Social History  Socioeconomic History    Marital status: Single   Occupational History    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 1 dog,    Deepak vapes      Social Drivers of Health     Financial Resource Strain: Low Risk  (3/14/2025)    Overall Financial Resource Strain (CARDIA)     Difficulty of Paying Living Expenses: Not hard at all   Food Insecurity: No Food Insecurity (3/14/2025)    Hunger Vital Sign     Worried About Running Out of Food in the Last Year: Never true     Ran Out of Food in the Last Year: Never true   Transportation Needs: No Transportation Needs (3/14/2025)    PRAPARE - Transportation     Lack of Transportation (Medical): No     Lack of Transportation (Non-Medical): No   Physical Activity: Insufficiently Active (3/14/2025)    Exercise Vital Sign     Days of Exercise per Week: 4 days     Minutes of Exercise per Session: 10 min   Stress: Stress  Concern Present (3/14/2025)    [facility] [place] [facility] Occupational [facility] - Occupational Stress Questionnaire     Feeling of Stress : To some extent   Housing Stability: Low Risk  (3/14/2025)    Housing Stability Vital Sign     Unable to Pay for Housing in the Last Year: No     Homeless in the Last Year: No

## 2025-07-11 DIAGNOSIS — F90.0 ATTENTION DEFICIT HYPERACTIVITY DISORDER (ADHD), PREDOMINANTLY INATTENTIVE TYPE: ICD-10-CM

## 2025-07-11 RX ORDER — DEXMETHYLPHENIDATE HYDROCHLORIDE 10 MG/1
10 TABLET ORAL 2 TIMES DAILY
Qty: 60 TABLET | Refills: 0 | Status: CANCELLED | OUTPATIENT
Start: 2025-07-11

## 2025-07-11 NOTE — TELEPHONE ENCOUNTER
Care Due:                  Date            Visit Type   Department     Provider  --------------------------------------------------------------------------------                                EP -                              PRIMARY      [facility]  Last Visit: 12-      CARE ([facility])   MEDICINE       Ijeoma Davidson  Next Visit: None Scheduled  None         None Found                                                            Last  Test          Frequency    Reason                     Performed    Due Date  --------------------------------------------------------------------------------    Office Visit  6 months...  VRAYLAR..................  12- 06-    CBC.........  12 months..  VRAYLAR..................  08- 08-    CMP.........  12 months..  VRAYLAR..................  08- 08-    Lipid Panel.  12 months..  VRAYLAR..................  08- 08-    [employer] Embedded Care Due Messages. Reference number: [id number].   7/11/2025 4:28:24 PM CDT

## 2025-08-26 ENCOUNTER — OFFICE VISIT (OUTPATIENT)
Dept: FAMILY MEDICINE | Facility: CLINIC | Age: 25
End: 2025-08-26
Payer: COMMERCIAL

## 2025-08-26 VITALS
TEMPERATURE: 98 F | HEIGHT: 74 IN | DIASTOLIC BLOOD PRESSURE: 86 MMHG | HEART RATE: 85 BPM | WEIGHT: 225.19 LBS | OXYGEN SATURATION: 97 % | RESPIRATION RATE: 16 BRPM | BODY MASS INDEX: 28.9 KG/M2 | SYSTOLIC BLOOD PRESSURE: 130 MMHG

## 2025-08-26 DIAGNOSIS — F90.0 ATTENTION DEFICIT HYPERACTIVITY DISORDER (ADHD), PREDOMINANTLY INATTENTIVE TYPE: ICD-10-CM

## 2025-08-26 DIAGNOSIS — J30.9 CHRONIC ALLERGIC RHINITIS: ICD-10-CM

## 2025-08-26 DIAGNOSIS — K21.9 GASTROESOPHAGEAL REFLUX DISEASE WITHOUT ESOPHAGITIS: ICD-10-CM

## 2025-08-26 DIAGNOSIS — F41.1 GAD (GENERALIZED ANXIETY DISORDER): ICD-10-CM

## 2025-08-26 DIAGNOSIS — G47.33 OSA (OBSTRUCTIVE SLEEP APNEA): ICD-10-CM

## 2025-08-26 DIAGNOSIS — E78.1 HYPERTRIGLYCERIDEMIA: ICD-10-CM

## 2025-08-26 DIAGNOSIS — Z00.00 WELL ADULT EXAM: Primary | ICD-10-CM

## 2025-08-26 DIAGNOSIS — F33.1 MAJOR DEPRESSIVE DISORDER, RECURRENT EPISODE, MODERATE: ICD-10-CM

## 2025-08-26 LAB
ABSOLUTE EOSINOPHIL (OHS): 0.23 K/UL
ABSOLUTE MONOCYTE (OHS): 0.5 K/UL (ref 0.3–1)
ABSOLUTE NEUTROPHIL COUNT (OHS): 4.05 K/UL (ref 1.8–7.7)
ALBUMIN SERPL BCP-MCNC: 4.4 G/DL (ref 3.5–5.2)
ALP SERPL-CCNC: 108 UNIT/L (ref 40–150)
ALT SERPL W/O P-5'-P-CCNC: 65 UNIT/L (ref 0–55)
ANION GAP (OHS): 8 MMOL/L (ref 8–16)
AST SERPL-CCNC: 36 UNIT/L (ref 0–50)
BASOPHILS # BLD AUTO: 0.06 K/UL
BASOPHILS NFR BLD AUTO: 0.8 %
BILIRUB SERPL-MCNC: 0.3 MG/DL (ref 0.1–1)
BUN SERPL-MCNC: 12 MG/DL (ref 6–20)
CALCIUM SERPL-MCNC: 9.2 MG/DL (ref 8.7–10.5)
CHLORIDE SERPL-SCNC: 110 MMOL/L (ref 95–110)
CHOLEST SERPL-MCNC: 157 MG/DL (ref 120–199)
CHOLEST/HDLC SERPL: 4.9 {RATIO} (ref 2–5)
CO2 SERPL-SCNC: 22 MMOL/L (ref 23–29)
CREAT SERPL-MCNC: 0.9 MG/DL (ref 0.5–1.4)
EAG (OHS): 105 MG/DL (ref 68–131)
ERYTHROCYTE [DISTWIDTH] IN BLOOD BY AUTOMATED COUNT: 13.2 % (ref 11.5–14.5)
GFR SERPLBLD CREATININE-BSD FMLA CKD-EPI: >60 ML/MIN/1.73/M2
GLUCOSE SERPL-MCNC: 103 MG/DL (ref 70–110)
HBA1C MFR BLD: 5.3 % (ref 4–5.6)
HCT VFR BLD AUTO: 43.9 % (ref 40–54)
HDLC SERPL-MCNC: 32 MG/DL (ref 40–75)
HDLC SERPL: 20.4 % (ref 20–50)
HGB BLD-MCNC: 14.2 GM/DL (ref 14–18)
IMM GRANULOCYTES # BLD AUTO: 0.03 K/UL (ref 0–0.04)
IMM GRANULOCYTES NFR BLD AUTO: 0.4 % (ref 0–0.5)
LDLC SERPL CALC-MCNC: 96.2 MG/DL (ref 63–159)
LYMPHOCYTES # BLD AUTO: 2.54 K/UL (ref 1–4.8)
MCH RBC QN AUTO: 27.4 PG (ref 27–31)
MCHC RBC AUTO-ENTMCNC: 32.3 G/DL (ref 32–36)
MCV RBC AUTO: 85 FL (ref 82–98)
NONHDLC SERPL-MCNC: 125 MG/DL
NUCLEATED RBC (/100WBC) (OHS): 0 /100 WBC
PLATELET # BLD AUTO: 349 K/UL (ref 150–450)
PMV BLD AUTO: 10.1 FL (ref 9.2–12.9)
POTASSIUM SERPL-SCNC: 4.6 MMOL/L (ref 3.5–5.1)
PROT SERPL-MCNC: 7.6 GM/DL (ref 6–8.4)
RBC # BLD AUTO: 5.18 M/UL (ref 4.6–6.2)
RELATIVE EOSINOPHIL (OHS): 3.1 %
RELATIVE LYMPHOCYTE (OHS): 34.3 % (ref 18–48)
RELATIVE MONOCYTE (OHS): 6.7 % (ref 4–15)
RELATIVE NEUTROPHIL (OHS): 54.7 % (ref 38–73)
SODIUM SERPL-SCNC: 140 MMOL/L (ref 136–145)
TRIGL SERPL-MCNC: 144 MG/DL (ref 30–150)
TSH SERPL-ACNC: 2.07 UIU/ML (ref 0.4–4)
WBC # BLD AUTO: 7.41 K/UL (ref 3.9–12.7)

## 2025-08-26 PROCEDURE — 1159F MED LIST DOCD IN RCRD: CPT | Mod: CPTII,S$GLB,, | Performed by: INTERNAL MEDICINE

## 2025-08-26 PROCEDURE — 3075F SYST BP GE 130 - 139MM HG: CPT | Mod: CPTII,S$GLB,, | Performed by: INTERNAL MEDICINE

## 2025-08-26 PROCEDURE — 3008F BODY MASS INDEX DOCD: CPT | Mod: CPTII,S$GLB,, | Performed by: INTERNAL MEDICINE

## 2025-08-26 PROCEDURE — 99395 PREV VISIT EST AGE 18-39: CPT | Mod: S$GLB,,, | Performed by: INTERNAL MEDICINE

## 2025-08-26 PROCEDURE — 1160F RVW MEDS BY RX/DR IN RCRD: CPT | Mod: CPTII,S$GLB,, | Performed by: INTERNAL MEDICINE

## 2025-08-26 PROCEDURE — 3079F DIAST BP 80-89 MM HG: CPT | Mod: CPTII,S$GLB,, | Performed by: INTERNAL MEDICINE

## 2025-08-26 RX ORDER — DEXMETHYLPHENIDATE HYDROCHLORIDE 10 MG/1
10 TABLET ORAL 2 TIMES DAILY
Qty: 60 TABLET | Refills: 0 | Status: SHIPPED | OUTPATIENT
Start: 2025-08-26

## 2025-08-27 ENCOUNTER — PATIENT MESSAGE (OUTPATIENT)
Dept: FAMILY MEDICINE | Facility: CLINIC | Age: 25
End: 2025-08-27
Payer: COMMERCIAL